# Patient Record
Sex: FEMALE | Race: WHITE | Employment: FULL TIME | URBAN - METROPOLITAN AREA
[De-identification: names, ages, dates, MRNs, and addresses within clinical notes are randomized per-mention and may not be internally consistent; named-entity substitution may affect disease eponyms.]

---

## 2020-01-27 ENCOUNTER — OFFICE VISIT (OUTPATIENT)
Dept: ORTHOPEDIC SURGERY | Age: 60
End: 2020-01-27
Payer: COMMERCIAL

## 2020-01-27 VITALS — WEIGHT: 285 LBS | HEIGHT: 63 IN | BODY MASS INDEX: 50.5 KG/M2

## 2020-01-27 PROCEDURE — 99203 OFFICE O/P NEW LOW 30 MIN: CPT | Performed by: ORTHOPAEDIC SURGERY

## 2020-01-27 PROCEDURE — 20610 DRAIN/INJ JOINT/BURSA W/O US: CPT | Performed by: ORTHOPAEDIC SURGERY

## 2020-01-27 RX ORDER — LIDOCAINE HYDROCHLORIDE 10 MG/ML
2 INJECTION, SOLUTION EPIDURAL; INFILTRATION; INTRACAUDAL; PERINEURAL ONCE
Status: COMPLETED | OUTPATIENT
Start: 2020-01-27 | End: 2020-01-27

## 2020-01-27 RX ORDER — BETAMETHASONE SODIUM PHOSPHATE AND BETAMETHASONE ACETATE 3; 3 MG/ML; MG/ML
12 INJECTION, SUSPENSION INTRA-ARTICULAR; INTRALESIONAL; INTRAMUSCULAR; SOFT TISSUE ONCE
Status: COMPLETED | OUTPATIENT
Start: 2020-01-27 | End: 2020-01-27

## 2020-01-27 RX ORDER — ATENOLOL 50 MG/1
50 TABLET ORAL DAILY
COMMUNITY

## 2020-01-27 RX ORDER — LOSARTAN POTASSIUM 50 MG/1
100 TABLET ORAL DAILY
COMMUNITY

## 2020-01-27 RX ORDER — MELOXICAM 15 MG/1
15 TABLET ORAL DAILY
Status: ON HOLD | COMMUNITY
End: 2020-10-20 | Stop reason: HOSPADM

## 2020-01-27 RX ORDER — HYDROCHLOROTHIAZIDE 12.5 MG/1
25 CAPSULE, GELATIN COATED ORAL DAILY
COMMUNITY

## 2020-01-27 RX ADMIN — LIDOCAINE HYDROCHLORIDE 2 ML: 10 INJECTION, SOLUTION EPIDURAL; INFILTRATION; INTRACAUDAL; PERINEURAL at 16:01

## 2020-01-27 RX ADMIN — BETAMETHASONE SODIUM PHOSPHATE AND BETAMETHASONE ACETATE 12 MG: 3; 3 INJECTION, SUSPENSION INTRA-ARTICULAR; INTRALESIONAL; INTRAMUSCULAR; SOFT TISSUE at 16:01

## 2020-05-20 ENCOUNTER — OFFICE VISIT (OUTPATIENT)
Dept: ORTHOPEDIC SURGERY | Age: 60
End: 2020-05-20
Payer: COMMERCIAL

## 2020-05-20 PROCEDURE — 20610 DRAIN/INJ JOINT/BURSA W/O US: CPT | Performed by: ORTHOPAEDIC SURGERY

## 2020-05-20 PROCEDURE — 99213 OFFICE O/P EST LOW 20 MIN: CPT | Performed by: ORTHOPAEDIC SURGERY

## 2020-05-20 RX ORDER — BUPIVACAINE HYDROCHLORIDE 5 MG/ML
2 INJECTION, SOLUTION PERINEURAL ONCE
Status: COMPLETED | OUTPATIENT
Start: 2020-05-20 | End: 2020-05-20

## 2020-05-20 RX ORDER — LIDOCAINE HYDROCHLORIDE 10 MG/ML
2 INJECTION, SOLUTION EPIDURAL; INFILTRATION; INTRACAUDAL; PERINEURAL ONCE
Status: COMPLETED | OUTPATIENT
Start: 2020-05-20 | End: 2020-05-20

## 2020-05-20 RX ORDER — BETAMETHASONE SODIUM PHOSPHATE AND BETAMETHASONE ACETATE 3; 3 MG/ML; MG/ML
12 INJECTION, SUSPENSION INTRA-ARTICULAR; INTRALESIONAL; INTRAMUSCULAR; SOFT TISSUE ONCE
Status: COMPLETED | OUTPATIENT
Start: 2020-05-20 | End: 2020-05-20

## 2020-05-20 RX ADMIN — BUPIVACAINE HYDROCHLORIDE 10 MG: 5 INJECTION, SOLUTION PERINEURAL at 15:28

## 2020-05-20 RX ADMIN — BETAMETHASONE SODIUM PHOSPHATE AND BETAMETHASONE ACETATE 12 MG: 3; 3 INJECTION, SUSPENSION INTRA-ARTICULAR; INTRALESIONAL; INTRAMUSCULAR; SOFT TISSUE at 15:27

## 2020-05-20 RX ADMIN — LIDOCAINE HYDROCHLORIDE 2 ML: 10 INJECTION, SOLUTION EPIDURAL; INFILTRATION; INTRACAUDAL; PERINEURAL at 15:28

## 2020-05-20 NOTE — PROGRESS NOTES
apnea      Past Surgical History:   Procedure Laterality Date    ECTOPIC PREGNANCY SURGERY       No family history on file. An informed verbal consent for the procedure was obtained and risks including, but not limited to: allergy to medications, injection, bleeding, stiffness of joint, recurrence of symptoms, loss of function, swelling, drainage, irrigation, need for surgery and pseudo-septic inflammation, were explained to the patient. Also, discussed was the potential for further injections, irrigation and debridement and surgery. Alternate means of treatment have also been discussed with the patient. Administrations This Visit     betamethasone acetate-betamethasone sodium phosphate (CELESTONE) injection 12 mg     Admin Date  05/20/2020  15:27 Action  Given Dose  12 mg Route  Intra-articular Site  Knee Left Administered By  Mary Kate Weaver    Ordering Provider:  Dameon Dumont MD    NDC:  1512-7019-58    Lot#:  [id number]    :  [id number] [address]iko Hackett     Patient Supplied?:  No          bupivacaine (MARCAINE) 0.5 % injection 10 mg     Admin Date  05/20/2020  15:28 Action  Given Dose  10 mg Route  Intra-articular Site  Knee Left Administered By  Mary Kate Weaver    Ordering Provider:  Dameon Dumont MD    ND:  6710-4604-24    Lot#:  [id number]    :  Svitlana Pouch    Patient Supplied?:  No          lidocaine PF 1 % injection 2 mL     Admin Date  05/20/2020  15:28 Action  Given Dose  2 mL Route  Intra-articular Site  Knee Left Administered By  Mary Kate Weaver    Ordering Provider:  Dameon Dumont MD    NDC:  3476-5548-02    Lot#:  [id number]    :  Brina Comer    Patient Supplied?:  No              Under sterile conditions the patient's right knee was injected with lidocaine and then followed with 2 cc of Celestone. She taught procedure well    Plan  Patient is aware that someday she will require total knee arthroplasty but she like to put this off. She was given a handicap parking prescription. When she does return here in 4 months we will require new standing x-rays of her knees. We will see her back at that time          Provider Attestation:  Shamika Olivares, personally performed the services described in this documentation. All medical record entries made by the scribe were at my direction and in my presence. I have reviewed the chart and discharge instructions and agree that the records reflect my personal performance and is accurate and complete. Adrianne Lee MD. 05/20/20      Please note that this chart was generated using voice recognition Dragon dictation software. Although every effort was made to ensure the accuracy of this automated transcription, some errors in transcription may have occurred.

## 2020-09-23 ENCOUNTER — OFFICE VISIT (OUTPATIENT)
Dept: ORTHOPEDIC SURGERY | Age: 60
End: 2020-09-23
Payer: COMMERCIAL

## 2020-09-23 PROCEDURE — 99213 OFFICE O/P EST LOW 20 MIN: CPT | Performed by: ORTHOPAEDIC SURGERY

## 2020-10-02 ENCOUNTER — TELEPHONE (OUTPATIENT)
Dept: ORTHOPEDIC SURGERY | Age: 60
End: 2020-10-02

## 2020-10-02 NOTE — TELEPHONE ENCOUNTER
[address] with Authorization for surgery [address], # [id number] good from 10/20/20 to 12/18/20.

## 2020-10-06 ENCOUNTER — HOSPITAL ENCOUNTER (OUTPATIENT)
Dept: PREADMISSION TESTING | Age: 60
Discharge: HOME | End: 2020-10-10
Payer: COMMERCIAL

## 2020-10-06 VITALS
WEIGHT: 280 LBS | RESPIRATION RATE: 16 BRPM | SYSTOLIC BLOOD PRESSURE: 159 MMHG | OXYGEN SATURATION: 97 % | HEART RATE: 70 BPM | TEMPERATURE: 97.6 F | DIASTOLIC BLOOD PRESSURE: 74 MMHG | HEIGHT: 63 IN | BODY MASS INDEX: 49.61 KG/M2

## 2020-10-06 LAB
-: ABNORMAL
ABSOLUTE EOS #: 0.1 K/UL (ref 0–0.4)
ABSOLUTE IMMATURE GRANULOCYTE: ABNORMAL K/UL (ref 0–0.3)
ABSOLUTE LYMPH #: 2 K/UL (ref 1–4.8)
ABSOLUTE MONO #: 0.5 K/UL (ref 0.1–1.3)
AMORPHOUS: ABNORMAL
ANION GAP SERPL CALCULATED.3IONS-SCNC: 13 MMOL/L (ref 9–17)
BACTERIA: ABNORMAL
BASOPHILS # BLD: 1 % (ref 0–2)
BASOPHILS ABSOLUTE: 0 K/UL (ref 0–0.2)
BILIRUBIN URINE: NEGATIVE
BUN BLDV-MCNC: 18 MG/DL (ref 8–23)
BUN/CREAT BLD: NORMAL (ref 9–20)
CALCIUM SERPL-MCNC: 10.1 MG/DL (ref 8.6–10.4)
CASTS UA: ABNORMAL /LPF
CHLORIDE BLD-SCNC: 102 MMOL/L (ref 98–107)
CO2: 26 MMOL/L (ref 20–31)
COLOR: YELLOW
COMMENT UA: ABNORMAL
CREAT SERPL-MCNC: 0.89 MG/DL (ref 0.5–0.9)
CRYSTALS, UA: ABNORMAL /HPF
DIFFERENTIAL TYPE: ABNORMAL
EOSINOPHILS RELATIVE PERCENT: 1 % (ref 0–4)
EPITHELIAL CELLS UA: ABNORMAL /HPF
GFR AFRICAN AMERICAN: >60 ML/MIN
GFR NON-AFRICAN AMERICAN: >60 ML/MIN
GFR SERPL CREATININE-BSD FRML MDRD: NORMAL ML/MIN/{1.73_M2}
GFR SERPL CREATININE-BSD FRML MDRD: NORMAL ML/MIN/{1.73_M2}
GLUCOSE BLD-MCNC: 96 MG/DL (ref 70–99)
GLUCOSE URINE: NEGATIVE
HCT VFR BLD CALC: 44.7 % (ref 36–46)
HEMOGLOBIN: 15.1 G/DL (ref 12–16)
IMMATURE GRANULOCYTES: ABNORMAL %
KETONES, URINE: NEGATIVE
LEUKOCYTE ESTERASE, URINE: NEGATIVE
LYMPHOCYTES # BLD: 33 % (ref 24–44)
MCH RBC QN AUTO: 30.9 PG (ref 26–34)
MCHC RBC AUTO-ENTMCNC: 33.8 G/DL (ref 31–37)
MCV RBC AUTO: 91.4 FL (ref 80–100)
MONOCYTES # BLD: 8 % (ref 1–7)
MUCUS: ABNORMAL
NITRITE, URINE: NEGATIVE
NRBC AUTOMATED: ABNORMAL PER 100 WBC
OTHER OBSERVATIONS UA: ABNORMAL
PDW BLD-RTO: 14.1 % (ref 11.5–14.9)
PH UA: 5 (ref 5–8)
PLATELET # BLD: 207 K/UL (ref 150–450)
PLATELET ESTIMATE: ABNORMAL
PMV BLD AUTO: 10.6 FL (ref 6–12)
POTASSIUM SERPL-SCNC: 4.4 MMOL/L (ref 3.7–5.3)
PROTEIN UA: NEGATIVE
RBC # BLD: 4.89 M/UL (ref 4–5.2)
RBC # BLD: ABNORMAL 10*6/UL
RBC UA: ABNORMAL /HPF
RENAL EPITHELIAL, UA: ABNORMAL /HPF
SEG NEUTROPHILS: 57 % (ref 36–66)
SEGMENTED NEUTROPHILS ABSOLUTE COUNT: 3.4 K/UL (ref 1.3–9.1)
SODIUM BLD-SCNC: 141 MMOL/L (ref 135–144)
SPECIFIC GRAVITY UA: 1.01 (ref 1–1.03)
TRICHOMONAS: ABNORMAL
TURBIDITY: CLEAR
URINE HGB: ABNORMAL
UROBILINOGEN, URINE: NORMAL
WBC # BLD: 6 K/UL (ref 3.5–11)
WBC # BLD: ABNORMAL 10*3/UL
WBC UA: ABNORMAL /HPF
YEAST: ABNORMAL

## 2020-10-06 PROCEDURE — 36415 COLL VENOUS BLD VENIPUNCTURE: CPT

## 2020-10-06 PROCEDURE — 80048 BASIC METABOLIC PNL TOTAL CA: CPT

## 2020-10-06 PROCEDURE — 87641 MR-STAPH DNA AMP PROBE: CPT

## 2020-10-06 PROCEDURE — 81001 URINALYSIS AUTO W/SCOPE: CPT

## 2020-10-06 PROCEDURE — 93005 ELECTROCARDIOGRAM TRACING: CPT | Performed by: ANESTHESIOLOGY

## 2020-10-06 PROCEDURE — 85025 COMPLETE CBC W/AUTO DIFF WBC: CPT

## 2020-10-06 NOTE — H&P (VIEW-ONLY)
HISTORY and Robina Crowder 5747       NAME:  Lc Moreno  MRN: [id number]   YOB: 1960   Date: 10/7/2020   Age: 56 y.o. Gender: female       COMPLAINT AND PRESENT HISTORY:     Urbano Lam is 56 y.o.,  female, undergoing preadmission testing for left knee pain with scheduled left total knee arthroplasty. Pt has history of degenerative joint disease to left knee. Pt c/o pain,  swelling, stiffness, popping and cracking in the left knee. Describes the pain as constant, worse with any activity. Pain is aggravated with walking for prolonged periods. Rating pain 8/10 in intensity at times. Takes Mobic with good relief. Resting helps alleviated pain. Pain does not radiate. Denies numbness or tingling. The knee does not buckle, give way under the patient. No recent falls, injury or trauma. No redness or rashes. No Hx of MRSA infections in the past.     PMHx  HTN: Takes losartan, HCTA and atenolol. Sleep Apnea: wears CPAP at night. Denies need for supplemental O2. PAST MEDICAL HISTORY     Past Medical History:   Diagnosis Date    Arthritis     HTN (hypertension)     Sleep apnea     cpap     Pt denies any history of Diabetes mellitus type 2, stroke, heart disease, COPD, Asthma, GERD, HLD, Cancer, Seizures,Thyroid disease, Kidney Disease, Hepatitis, TB, Psychiatric Disorders or Substance abuse. SURGICAL HISTORY       Past Surgical History:   Procedure Laterality Date    ECTOPIC PREGNANCY SURGERY         FAMILY HISTORY     History reviewed. No pertinent family history.     SOCIAL HISTORY       Social History     Socioeconomic History    Marital status:      Spouse name: None    Number of children: None    Years of education: None    Highest education level: None   Occupational History    None   Social Needs    Financial resource strain: None    Food insecurity     Worry: None     Inability: None    Transportation needs     Medical: None Non-medical: None   Tobacco Use    Smoking status: Never Smoker    Smokeless tobacco: Never Used   Substance and Sexual Activity    Alcohol use: Yes     Comment: social but rarely    Drug use: Never    Sexual activity: None   Lifestyle    Physical activity     Days per week: None     Minutes per session: None    Stress: None   Relationships    Social connections     Talks on phone: None     Gets together: None     Attends Restorationism service: None     Active member of club or organization: None     Attends meetings of clubs or organizations: None     Relationship status: None    Intimate partner violence     Fear of current or ex partner: None     Emotionally abused: None     Physically abused: None     Forced sexual activity: None   Other Topics Concern    None   Social History Narrative    None        REVIEW OF SYSTEMS      Allergies   Allergen Reactions    Lisinopril Other (See Comments)     Cough        Current Outpatient Medications on File Prior to Encounter   Medication Sig Dispense Refill    losartan (COZAAR) 50 MG tablet Take 50 mg by mouth daily      hydrochlorothiazide (MICROZIDE) 12.5 MG capsule Take 12.5 mg by mouth daily      meloxicam (MOBIC) 15 MG tablet Take 15 mg by mouth daily      atenolol (TENORMIN) 50 MG tablet Take 50 mg by mouth daily       No current facility-administered medications on file prior to encounter. General health:  Fairly good. No fever or chills. Skin:  No itching, redness or rash. HEENT:  No headache, epistaxis or sore throat. Neck:  No pain, stiffness or masses. Cardiovascular/Respiratory system:  No chest pain, palpitation or shortness of breath. Gastrointestinal tract: No abdominal pain, Dysphagia, nausea, vomiting, diarrhea or constipation. Genitourinary:  No burning on micturition. No hesitancy, urgency, frequency or discoloration of urine. Locomotor:  See HPI. Neuropsychiatric:  No referable complaints. GENERAL PHYSICAL EXAM:     Vitals: BP (!) 159/74   Pulse 70   Temp 97.6 °F (36.4 °C) (Temporal)   Resp 16   Ht 5' 3\" (1.6 m)   Wt 280 lb (127 kg)   SpO2 97%   BMI 49.60 kg/m²  Body mass index is 49.6 kg/m². GENERAL APPEARANCE:   Ariella Lam is 56 y.o.,  female, moderately obese, nourished, conscious, alert. Does not appear to be in distress or pain at this time. SKIN:  Warm, dry, no cyanosis or jaundice. HEAD:  Normocephalic, atraumatic. EYES:  Pupils equal, reactive to light. EARS:  No discharge, no marked hearing loss. NOSE:  No rhinorrhea, epistaxis or septal deformity. THROAT:  Not congested. No ulceration bleeding or discharge. NECK:  No stiffness, trachea central.  No palpable masses or L.N.                 CHEST:  Symmetrical and equal on expansion. HEART:  Hypertensive. RRR S1 > S2. No audible murmurs or gallops. LUNGS:  Equal on expansion, normal breath sounds. No wheezing, rhonchi or rales. ABDOMEN:  Soft on palpation. No localized tenderness. No guarding or rigidity. LYMPHATICS:  No palpable cervical lymphadenopathy. LOCOMOTOR, BACK AND SPINE:  No tenderness or deformities. EXTREMITIES:  Tenderness on palpation of the left knee joint space worse on the medial and lateral aspect. Symmetrical, no pretibial edema. No calf tenderness. No discoloration or ulcerations. NEUROLOGIC:  The patient is conscious, alert, oriented. No facial drooping. Speech clear. No apparent focal sensory or motor deficits.                                                                                      PROVISIONAL DIAGNOSES / SURGERY:      Left knee pain    KNEE TOTAL ARTHROPLASTY  Left      There are no active

## 2020-10-06 NOTE — H&P
HISTORY and Robina Crowder 5747       NAME:  Katherine Higgins  MRN: [id number]   YOB: 1960   Date: 10/7/2020   Age: 56 y.o. Gender: female       COMPLAINT AND PRESENT HISTORY:     Andre Lam is 56 y.o.,  female, undergoing preadmission testing for left knee pain with scheduled left total knee arthroplasty. Pt has history of degenerative joint disease to left knee. Pt c/o pain,  swelling, stiffness, popping and cracking in the left knee. Describes the pain as constant, worse with any activity. Pain is aggravated with walking for prolonged periods. Rating pain 8/10 in intensity at times. Takes Mobic with good relief. Resting helps alleviated pain. Pain does not radiate. Denies numbness or tingling. The knee does not buckle, give way under the patient. No recent falls, injury or trauma. No redness or rashes. No Hx of MRSA infections in the past.     PMHx  HTN: Takes losartan, HCTA and atenolol. Sleep Apnea: wears CPAP at night. Denies need for supplemental O2. PAST MEDICAL HISTORY     Past Medical History:   Diagnosis Date    Arthritis     HTN (hypertension)     Sleep apnea     cpap     Pt denies any history of Diabetes mellitus type 2, stroke, heart disease, COPD, Asthma, GERD, HLD, Cancer, Seizures,Thyroid disease, Kidney Disease, Hepatitis, TB, Psychiatric Disorders or Substance abuse. SURGICAL HISTORY       Past Surgical History:   Procedure Laterality Date    ECTOPIC PREGNANCY SURGERY         FAMILY HISTORY     History reviewed. No pertinent family history.     SOCIAL HISTORY       Social History     Socioeconomic History    Marital status:      Spouse name: None    Number of children: None    Years of education: None    Highest education level: None   Occupational History    None   Social Needs    Financial resource strain: None    Food insecurity     Worry: None     Inability: None    Transportation needs     Medical: None Non-medical: None   Tobacco Use    Smoking status: Never Smoker    Smokeless tobacco: Never Used   Substance and Sexual Activity    Alcohol use: Yes     Comment: social but rarely    Drug use: Never    Sexual activity: None   Lifestyle    Physical activity     Days per week: None     Minutes per session: None    Stress: None   Relationships    Social connections     Talks on phone: None     Gets together: None     Attends Muslim service: None     Active member of club or organization: None     Attends meetings of clubs or organizations: None     Relationship status: None    Intimate partner violence     Fear of current or ex partner: None     Emotionally abused: None     Physically abused: None     Forced sexual activity: None   Other Topics Concern    None   Social History Narrative    None        REVIEW OF SYSTEMS      Allergies   Allergen Reactions    Lisinopril Other (See Comments)     Cough        Current Outpatient Medications on File Prior to Encounter   Medication Sig Dispense Refill    losartan (COZAAR) 50 MG tablet Take 50 mg by mouth daily      hydrochlorothiazide (MICROZIDE) 12.5 MG capsule Take 12.5 mg by mouth daily      meloxicam (MOBIC) 15 MG tablet Take 15 mg by mouth daily      atenolol (TENORMIN) 50 MG tablet Take 50 mg by mouth daily       No current facility-administered medications on file prior to encounter. General health:  Fairly good. No fever or chills. Skin:  No itching, redness or rash. HEENT:  No headache, epistaxis or sore throat. Neck:  No pain, stiffness or masses. Cardiovascular/Respiratory system:  No chest pain, palpitation or shortness of breath. Gastrointestinal tract: No abdominal pain, Dysphagia, nausea, vomiting, diarrhea or constipation. Genitourinary:  No burning on micturition. No hesitancy, urgency, frequency or discoloration of urine. Locomotor:  See HPI. Neuropsychiatric:  No referable complaints. GENERAL PHYSICAL EXAM:     Vitals: BP (!) 159/74   Pulse 70   Temp 97.6 °F (36.4 °C) (Temporal)   Resp 16   Ht 5' 3\" (1.6 m)   Wt 280 lb (127 kg)   SpO2 97%   BMI 49.60 kg/m²  Body mass index is 49.6 kg/m². GENERAL APPEARANCE:   Lyn Lam is 56 y.o.,  female, moderately obese, nourished, conscious, alert. Does not appear to be in distress or pain at this time. SKIN:  Warm, dry, no cyanosis or jaundice. HEAD:  Normocephalic, atraumatic. EYES:  Pupils equal, reactive to light. EARS:  No discharge, no marked hearing loss. NOSE:  No rhinorrhea, epistaxis or septal deformity. THROAT:  Not congested. No ulceration bleeding or discharge. NECK:  No stiffness, trachea central.  No palpable masses or L.N.                 CHEST:  Symmetrical and equal on expansion. HEART:  Hypertensive. RRR S1 > S2. No audible murmurs or gallops. LUNGS:  Equal on expansion, normal breath sounds. No wheezing, rhonchi or rales. ABDOMEN:  Soft on palpation. No localized tenderness. No guarding or rigidity. LYMPHATICS:  No palpable cervical lymphadenopathy. LOCOMOTOR, BACK AND SPINE:  No tenderness or deformities. EXTREMITIES:  Tenderness on palpation of the left knee joint space worse on the medial and lateral aspect. Symmetrical, no pretibial edema. No calf tenderness. No discoloration or ulcerations. NEUROLOGIC:  The patient is conscious, alert, oriented. No facial drooping. Speech clear. No apparent focal sensory or motor deficits.                                                                                      PROVISIONAL DIAGNOSES / SURGERY:      Left knee pain    KNEE TOTAL ARTHROPLASTY  Left      There are no active problems to display for this patient.           JOJO Anthony - CNP on 10/7/2020 at 8:01 AM

## 2020-10-06 NOTE — PROGRESS NOTES
Dr. Aggarwal Records, anesthesia, was contacted and informed of the patient's planned surgery, history, and unconfirmed abnormal EKG results from EKG done today in [facility]. No clearance required.

## 2020-10-07 LAB
EKG ATRIAL RATE: 66 BPM
EKG P AXIS: 60 DEGREES
EKG P-R INTERVAL: 176 MS
EKG Q-T INTERVAL: 408 MS
EKG QRS DURATION: 102 MS
EKG QTC CALCULATION (BAZETT): 427 MS
EKG R AXIS: -16 DEGREES
EKG T AXIS: 19 DEGREES
EKG VENTRICULAR RATE: 66 BPM
MRSA, DNA, NASAL: ABNORMAL
SPECIMEN DESCRIPTION: ABNORMAL

## 2020-10-07 PROCEDURE — 93010 ELECTROCARDIOGRAM REPORT: CPT | Performed by: INTERNAL MEDICINE

## 2020-10-08 ASSESSMENT — PROMIS GLOBAL HEALTH SCALE
IN GENERAL, PLEASE RATE HOW WELL YOU CARRY OUT YOUR USUAL SOCIAL ACTIVITIES (INCLUDES ACTIVITIES AT HOME, AT WORK, AND IN YOUR COMMUNITY, AND RESPONSIBILITIES AS A PARENT, CHILD, SPOUSE, EMPLOYEE, FRIEND, ETC) [ON A SCALE OF 1 (POOR) TO 5 (EXCELLENT)]?: 4
IN THE PAST 7 DAYS, HOW WOULD YOU RATE YOUR FATIGUE ON AVERAGE [ON A SCALE FROM 1 (NONE) TO 5 (VERY SEVERE)]?: 4
HOW IS THE PROMIS V1.1 BEING ADMINISTERED?: 2
IN GENERAL, WOULD YOU SAY YOUR QUALITY OF LIFE IS...[ON A SCALE OF 1 (POOR) TO 5 (EXCELLENT)]: 4
IN GENERAL, HOW WOULD YOU RATE YOUR PHYSICAL HEALTH [ON A SCALE OF 1 (POOR) TO 5 (EXCELLENT)]?: 3
IN GENERAL, HOW WOULD YOU RATE YOUR MENTAL HEALTH, INCLUDING YOUR MOOD AND YOUR ABILITY TO THINK [ON A SCALE OF 1 (POOR) TO 5 (EXCELLENT)]?: 4
IN THE PAST 7 DAYS, HOW OFTEN HAVE YOU BEEN BOTHERED BY EMOTIONAL PROBLEMS, SUCH AS FEELING ANXIOUS, DEPRESSED, OR IRRITABLE [ON A SCALE FROM 1 (NEVER) TO 5 (ALWAYS)]?: 2
IN GENERAL, HOW WOULD YOU RATE YOUR SATISFACTION WITH YOUR SOCIAL ACTIVITIES AND RELATIONSHIPS [ON A SCALE OF 1 (POOR) TO 5 (EXCELLENT)]?: 4
WHO IS THE PERSON COMPLETING THE PROMIS V1.1 SURVEY?: 0
TO WHAT EXTENT ARE YOU ABLE TO CARRY OUT YOUR EVERYDAY PHYSICAL ACTIVITIES SUCH AS WALKING, CLIMBING STAIRS, CARRYING GROCERIES, OR MOVING A CHAIR [ON A SCALE OF 1 (NOT AT ALL) TO 5 (COMPLETELY)]?: 2
IN GENERAL, WOULD YOU SAY YOUR HEALTH IS...[ON A SCALE OF 1 (POOR) TO 5 (EXCELLENT)]: 3
IN THE PAST 7 DAYS, HOW WOULD YOU RATE YOUR PAIN ON AVERAGE [ON A SCALE FROM 0 (NO PAIN) TO 10 (WORST IMAGINABLE PAIN)]?: 6

## 2020-10-08 ASSESSMENT — KOOS JR
STANDING UPRIGHT: 2
STRAIGHTENING KNEE FULLY: 2
BENDING TO THE FLOOR TO PICK UP OBJECT: 2
GOING UP OR DOWN STAIRS: 3
TWISING OR PIVOTING ON KNEE: 2
RISING FROM SITTING: 2
HOW SEVERE IS YOUR KNEE STIFFNESS AFTER FIRST WAKING IN MORNING: 3
HOW SEVERE IS YOUR KNEE STIFFNESS AFTER FIRST WAKING IN MORNING: 2

## 2020-10-16 ENCOUNTER — HOSPITAL ENCOUNTER (OUTPATIENT)
Dept: PREADMISSION TESTING | Age: 60
Setting detail: SPECIMEN
Discharge: HOME | End: 2020-10-20
Payer: COMMERCIAL

## 2020-10-16 PROCEDURE — U0003 INFECTIOUS AGENT DETECTION BY NUCLEIC ACID (DNA OR RNA); SEVERE ACUTE RESPIRATORY SYNDROME CORONAVIRUS 2 (SARS-COV-2) (CORONAVIRUS DISEASE [COVID-19]), AMPLIFIED PROBE TECHNIQUE, MAKING USE OF HIGH THROUGHPUT TECHNOLOGIES AS DESCRIBED BY CMS-2020-01-R: HCPCS

## 2020-10-18 LAB — SARS-COV-2, NAA: NOT DETECTED

## 2020-10-19 ENCOUNTER — ANESTHESIA EVENT (OUTPATIENT)
Dept: OPERATING ROOM | Age: 60
End: 2020-10-19
Payer: COMMERCIAL

## 2020-10-19 ENCOUNTER — TELEPHONE (OUTPATIENT)
Dept: PRIMARY CARE CLINIC | Age: 60
End: 2020-10-19

## 2020-10-20 ENCOUNTER — APPOINTMENT (OUTPATIENT)
Dept: GENERAL RADIOLOGY | Age: 60
End: 2020-10-20
Attending: ORTHOPAEDIC SURGERY
Payer: COMMERCIAL

## 2020-10-20 ENCOUNTER — ANESTHESIA (OUTPATIENT)
Dept: OPERATING ROOM | Age: 60
End: 2020-10-20
Payer: COMMERCIAL

## 2020-10-20 ENCOUNTER — HOSPITAL ENCOUNTER (OUTPATIENT)
Age: 60
Discharge: HOME | End: 2020-10-20
Attending: ORTHOPAEDIC SURGERY | Admitting: ORTHOPAEDIC SURGERY
Payer: COMMERCIAL

## 2020-10-20 VITALS — TEMPERATURE: 97 F | SYSTOLIC BLOOD PRESSURE: 138 MMHG | DIASTOLIC BLOOD PRESSURE: 79 MMHG | OXYGEN SATURATION: 99 %

## 2020-10-20 VITALS
RESPIRATION RATE: 16 BRPM | TEMPERATURE: 97.7 F | WEIGHT: 280 LBS | OXYGEN SATURATION: 100 % | HEART RATE: 51 BPM | BODY MASS INDEX: 49.61 KG/M2 | HEIGHT: 63 IN | DIASTOLIC BLOOD PRESSURE: 55 MMHG | SYSTOLIC BLOOD PRESSURE: 107 MMHG

## 2020-10-20 PROBLEM — M17.12 PRIMARY OSTEOARTHRITIS OF LEFT KNEE: Status: ACTIVE | Noted: 2020-10-20

## 2020-10-20 PROCEDURE — C1776 JOINT DEVICE (IMPLANTABLE): HCPCS | Performed by: ORTHOPAEDIC SURGERY

## 2020-10-20 PROCEDURE — 2709999900 HC NON-CHARGEABLE SUPPLY: Performed by: ORTHOPAEDIC SURGERY

## 2020-10-20 PROCEDURE — 97535 SELF CARE MNGMENT TRAINING: CPT

## 2020-10-20 PROCEDURE — 97110 THERAPEUTIC EXERCISES: CPT

## 2020-10-20 PROCEDURE — 97116 GAIT TRAINING THERAPY: CPT

## 2020-10-20 PROCEDURE — 6360000002 HC RX W HCPCS: Performed by: ORTHOPAEDIC SURGERY

## 2020-10-20 PROCEDURE — 3700000000 HC ANESTHESIA ATTENDED CARE: Performed by: ORTHOPAEDIC SURGERY

## 2020-10-20 PROCEDURE — 7100000000 HC PACU RECOVERY - FIRST 15 MIN: Performed by: ORTHOPAEDIC SURGERY

## 2020-10-20 PROCEDURE — 97165 OT EVAL LOW COMPLEX 30 MIN: CPT

## 2020-10-20 PROCEDURE — 97162 PT EVAL MOD COMPLEX 30 MIN: CPT

## 2020-10-20 PROCEDURE — 2500000003 HC RX 250 WO HCPCS: Performed by: ORTHOPAEDIC SURGERY

## 2020-10-20 PROCEDURE — C9290 INJ, BUPIVACAINE LIPOSOME: HCPCS | Performed by: ANESTHESIOLOGY

## 2020-10-20 PROCEDURE — 2580000003 HC RX 258: Performed by: ORTHOPAEDIC SURGERY

## 2020-10-20 PROCEDURE — 2720000010 HC SURG SUPPLY STERILE: Performed by: ORTHOPAEDIC SURGERY

## 2020-10-20 PROCEDURE — 3700000001 HC ADD 15 MINUTES (ANESTHESIA): Performed by: ORTHOPAEDIC SURGERY

## 2020-10-20 PROCEDURE — 73560 X-RAY EXAM OF KNEE 1 OR 2: CPT

## 2020-10-20 PROCEDURE — 2580000003 HC RX 258: Performed by: ANESTHESIOLOGY

## 2020-10-20 PROCEDURE — 27447 TOTAL KNEE ARTHROPLASTY: CPT | Performed by: ORTHOPAEDIC SURGERY

## 2020-10-20 PROCEDURE — 6370000000 HC RX 637 (ALT 250 FOR IP): Performed by: ORTHOPAEDIC SURGERY

## 2020-10-20 PROCEDURE — 3600000013 HC SURGERY LEVEL 3 ADDTL 15MIN: Performed by: ORTHOPAEDIC SURGERY

## 2020-10-20 PROCEDURE — 6360000002 HC RX W HCPCS: Performed by: NURSE ANESTHETIST, CERTIFIED REGISTERED

## 2020-10-20 PROCEDURE — 3600000003 HC SURGERY LEVEL 3 BASE: Performed by: ORTHOPAEDIC SURGERY

## 2020-10-20 PROCEDURE — 2500000003 HC RX 250 WO HCPCS: Performed by: NURSE ANESTHETIST, CERTIFIED REGISTERED

## 2020-10-20 PROCEDURE — C1713 ANCHOR/SCREW BN/BN,TIS/BN: HCPCS | Performed by: ORTHOPAEDIC SURGERY

## 2020-10-20 PROCEDURE — 2500000003 HC RX 250 WO HCPCS: Performed by: ANESTHESIOLOGY

## 2020-10-20 PROCEDURE — 7100000001 HC PACU RECOVERY - ADDTL 15 MIN: Performed by: ORTHOPAEDIC SURGERY

## 2020-10-20 PROCEDURE — 6360000002 HC RX W HCPCS: Performed by: ANESTHESIOLOGY

## 2020-10-20 PROCEDURE — 64447 NJX AA&/STRD FEMORAL NRV IMG: CPT | Performed by: ANESTHESIOLOGY

## 2020-10-20 DEVICE — CEMENT BNE 40GM HI VISC RADPQ FOR REV SURG: Type: IMPLANTABLE DEVICE | Site: KNEE | Status: FUNCTIONAL

## 2020-10-20 DEVICE — TRAY TIB L71MM KNEE CO CHROM FIN MOD INTLOK VANGUARD: Type: IMPLANTABLE DEVICE | Site: KNEE | Status: FUNCTIONAL

## 2020-10-20 DEVICE — COMPONENT PAT DIA31MM THK8MM STD KNEE TI ALLY S STL UHMWPE: Type: IMPLANTABLE DEVICE | Site: KNEE | Status: FUNCTIONAL

## 2020-10-20 DEVICE — IMPLANTABLE DEVICE: Type: IMPLANTABLE DEVICE | Site: KNEE | Status: FUNCTIONAL

## 2020-10-20 DEVICE — VNGD ANT STAB BRG 13X71: Type: IMPLANTABLE DEVICE | Site: KNEE | Status: FUNCTIONAL

## 2020-10-20 RX ORDER — LABETALOL HYDROCHLORIDE 5 MG/ML
5 INJECTION, SOLUTION INTRAVENOUS EVERY 10 MIN PRN
Status: DISCONTINUED | OUTPATIENT
Start: 2020-10-20 | End: 2020-10-20 | Stop reason: HOSPADM

## 2020-10-20 RX ORDER — OXYCODONE HYDROCHLORIDE AND ACETAMINOPHEN 5; 325 MG/1; MG/1
1 TABLET ORAL PRN
Status: DISCONTINUED | OUTPATIENT
Start: 2020-10-20 | End: 2020-10-20 | Stop reason: HOSPADM

## 2020-10-20 RX ORDER — NEOSTIGMINE METHYLSULFATE 5 MG/5 ML
SYRINGE (ML) INTRAVENOUS PRN
Status: DISCONTINUED | OUTPATIENT
Start: 2020-10-20 | End: 2020-10-20 | Stop reason: SDUPTHER

## 2020-10-20 RX ORDER — OXYCODONE HYDROCHLORIDE AND ACETAMINOPHEN 5; 325 MG/1; MG/1
2 TABLET ORAL PRN
Status: DISCONTINUED | OUTPATIENT
Start: 2020-10-20 | End: 2020-10-20 | Stop reason: HOSPADM

## 2020-10-20 RX ORDER — ROCURONIUM BROMIDE 10 MG/ML
INJECTION, SOLUTION INTRAVENOUS PRN
Status: DISCONTINUED | OUTPATIENT
Start: 2020-10-20 | End: 2020-10-20 | Stop reason: SDUPTHER

## 2020-10-20 RX ORDER — SENNA AND DOCUSATE SODIUM 50; 8.6 MG/1; MG/1
1 TABLET, FILM COATED ORAL 2 TIMES DAILY
Status: DISCONTINUED | OUTPATIENT
Start: 2020-10-20 | End: 2020-10-20 | Stop reason: HOSPADM

## 2020-10-20 RX ORDER — SODIUM CHLORIDE, SODIUM LACTATE, POTASSIUM CHLORIDE, CALCIUM CHLORIDE 600; 310; 30; 20 MG/100ML; MG/100ML; MG/100ML; MG/100ML
INJECTION, SOLUTION INTRAVENOUS CONTINUOUS
Status: DISCONTINUED | OUTPATIENT
Start: 2020-10-20 | End: 2020-10-20

## 2020-10-20 RX ORDER — FENTANYL CITRATE 50 UG/ML
INJECTION, SOLUTION INTRAMUSCULAR; INTRAVENOUS PRN
Status: DISCONTINUED | OUTPATIENT
Start: 2020-10-20 | End: 2020-10-20 | Stop reason: SDUPTHER

## 2020-10-20 RX ORDER — SODIUM CHLORIDE 0.9 % (FLUSH) 0.9 %
10 SYRINGE (ML) INJECTION EVERY 12 HOURS SCHEDULED
Status: DISCONTINUED | OUTPATIENT
Start: 2020-10-20 | End: 2020-10-20 | Stop reason: HOSPADM

## 2020-10-20 RX ORDER — SODIUM CHLORIDE 0.9 % (FLUSH) 0.9 %
10 SYRINGE (ML) INJECTION PRN
Status: DISCONTINUED | OUTPATIENT
Start: 2020-10-20 | End: 2020-10-20 | Stop reason: HOSPADM

## 2020-10-20 RX ORDER — ACETAMINOPHEN 325 MG/1
650 TABLET ORAL EVERY 6 HOURS
Status: DISCONTINUED | OUTPATIENT
Start: 2020-10-20 | End: 2020-10-20 | Stop reason: HOSPADM

## 2020-10-20 RX ORDER — LIDOCAINE HYDROCHLORIDE 10 MG/ML
1 INJECTION, SOLUTION EPIDURAL; INFILTRATION; INTRACAUDAL; PERINEURAL
Status: DISCONTINUED | OUTPATIENT
Start: 2020-10-20 | End: 2020-10-20 | Stop reason: HOSPADM

## 2020-10-20 RX ORDER — ONDANSETRON 2 MG/ML
4 INJECTION INTRAMUSCULAR; INTRAVENOUS EVERY 6 HOURS PRN
Status: DISCONTINUED | OUTPATIENT
Start: 2020-10-20 | End: 2020-10-20 | Stop reason: HOSPADM

## 2020-10-20 RX ORDER — OXYCODONE HYDROCHLORIDE AND ACETAMINOPHEN 5; 325 MG/1; MG/1
1-2 TABLET ORAL EVERY 4 HOURS PRN
Qty: 42 TABLET | Refills: 0 | Status: SHIPPED | OUTPATIENT
Start: 2020-10-20 | End: 2020-10-27

## 2020-10-20 RX ORDER — ASPIRIN/CALCIUM/MAG/ALUMINUM 325 MG
1 TABLET ORAL 2 TIMES DAILY
Qty: 30 TABLET | Refills: 3 | Status: SHIPPED | OUTPATIENT
Start: 2020-10-20 | End: 2022-10-12

## 2020-10-20 RX ORDER — CALCIUM CHLORIDE 100 MG/ML
INJECTION INTRAVENOUS; INTRAVENTRICULAR PRN
Status: DISCONTINUED | OUTPATIENT
Start: 2020-10-20 | End: 2020-10-20 | Stop reason: ALTCHOICE

## 2020-10-20 RX ORDER — DEXAMETHASONE SODIUM PHOSPHATE 10 MG/ML
10 INJECTION INTRAMUSCULAR; INTRAVENOUS ONCE
Status: COMPLETED | OUTPATIENT
Start: 2020-10-20 | End: 2020-10-20

## 2020-10-20 RX ORDER — BUPIVACAINE HYDROCHLORIDE 5 MG/ML
INJECTION, SOLUTION EPIDURAL; INTRACAUDAL
Status: DISCONTINUED | OUTPATIENT
Start: 2020-10-20 | End: 2020-10-20 | Stop reason: SDUPTHER

## 2020-10-20 RX ORDER — DIPHENHYDRAMINE HYDROCHLORIDE 50 MG/ML
12.5 INJECTION INTRAMUSCULAR; INTRAVENOUS
Status: DISCONTINUED | OUTPATIENT
Start: 2020-10-20 | End: 2020-10-20 | Stop reason: HOSPADM

## 2020-10-20 RX ORDER — ONDANSETRON 2 MG/ML
4 INJECTION INTRAMUSCULAR; INTRAVENOUS
Status: DISCONTINUED | OUTPATIENT
Start: 2020-10-20 | End: 2020-10-20 | Stop reason: HOSPADM

## 2020-10-20 RX ORDER — ONDANSETRON 2 MG/ML
INJECTION INTRAMUSCULAR; INTRAVENOUS PRN
Status: DISCONTINUED | OUTPATIENT
Start: 2020-10-20 | End: 2020-10-20 | Stop reason: SDUPTHER

## 2020-10-20 RX ORDER — LIDOCAINE HYDROCHLORIDE 10 MG/ML
INJECTION, SOLUTION EPIDURAL; INFILTRATION; INTRACAUDAL; PERINEURAL PRN
Status: DISCONTINUED | OUTPATIENT
Start: 2020-10-20 | End: 2020-10-20 | Stop reason: SDUPTHER

## 2020-10-20 RX ORDER — EPHEDRINE SULFATE/0.9% NACL/PF 50 MG/5 ML
SYRINGE (ML) INTRAVENOUS PRN
Status: DISCONTINUED | OUTPATIENT
Start: 2020-10-20 | End: 2020-10-20 | Stop reason: SDUPTHER

## 2020-10-20 RX ORDER — MIDAZOLAM HYDROCHLORIDE 1 MG/ML
INJECTION INTRAMUSCULAR; INTRAVENOUS PRN
Status: DISCONTINUED | OUTPATIENT
Start: 2020-10-20 | End: 2020-10-20 | Stop reason: SDUPTHER

## 2020-10-20 RX ORDER — SUCCINYLCHOLINE/SOD CL,ISO/PF 200MG/10ML
SYRINGE (ML) INTRAVENOUS PRN
Status: DISCONTINUED | OUTPATIENT
Start: 2020-10-20 | End: 2020-10-20 | Stop reason: SDUPTHER

## 2020-10-20 RX ORDER — DEXAMETHASONE SODIUM PHOSPHATE 4 MG/ML
INJECTION, SOLUTION INTRA-ARTICULAR; INTRALESIONAL; INTRAMUSCULAR; INTRAVENOUS; SOFT TISSUE PRN
Status: DISCONTINUED | OUTPATIENT
Start: 2020-10-20 | End: 2020-10-20 | Stop reason: SDUPTHER

## 2020-10-20 RX ORDER — OXYCODONE HYDROCHLORIDE 5 MG/1
5 TABLET ORAL EVERY 4 HOURS PRN
Status: DISCONTINUED | OUTPATIENT
Start: 2020-10-20 | End: 2020-10-20 | Stop reason: HOSPADM

## 2020-10-20 RX ORDER — PROMETHAZINE HYDROCHLORIDE 25 MG/1
12.5 TABLET ORAL EVERY 6 HOURS PRN
Status: DISCONTINUED | OUTPATIENT
Start: 2020-10-20 | End: 2020-10-20 | Stop reason: HOSPADM

## 2020-10-20 RX ORDER — GLYCOPYRROLATE 1 MG/5 ML
SYRINGE (ML) INTRAVENOUS PRN
Status: DISCONTINUED | OUTPATIENT
Start: 2020-10-20 | End: 2020-10-20 | Stop reason: SDUPTHER

## 2020-10-20 RX ORDER — SCOLOPAMINE TRANSDERMAL SYSTEM 1 MG/1
1 PATCH, EXTENDED RELEASE TRANSDERMAL ONCE
Status: DISCONTINUED | OUTPATIENT
Start: 2020-10-20 | End: 2020-10-20

## 2020-10-20 RX ORDER — GABAPENTIN 600 MG/1
600 TABLET ORAL ONCE
Status: COMPLETED | OUTPATIENT
Start: 2020-10-20 | End: 2020-10-20

## 2020-10-20 RX ORDER — ACETAMINOPHEN 500 MG
1000 TABLET ORAL ONCE
Status: COMPLETED | OUTPATIENT
Start: 2020-10-20 | End: 2020-10-20

## 2020-10-20 RX ORDER — OXYCODONE HYDROCHLORIDE 10 MG/1
10 TABLET ORAL EVERY 4 HOURS PRN
Status: DISCONTINUED | OUTPATIENT
Start: 2020-10-20 | End: 2020-10-20 | Stop reason: HOSPADM

## 2020-10-20 RX ORDER — SODIUM CHLORIDE, SODIUM LACTATE, POTASSIUM CHLORIDE, CALCIUM CHLORIDE 600; 310; 30; 20 MG/100ML; MG/100ML; MG/100ML; MG/100ML
INJECTION, SOLUTION INTRAVENOUS CONTINUOUS
Status: DISCONTINUED | OUTPATIENT
Start: 2020-10-20 | End: 2020-10-20 | Stop reason: HOSPADM

## 2020-10-20 RX ADMIN — GABAPENTIN 600 MG: 600 TABLET, FILM COATED ORAL at 07:59

## 2020-10-20 RX ADMIN — ROCURONIUM BROMIDE 30 MG: 10 INJECTION INTRAVENOUS at 08:54

## 2020-10-20 RX ADMIN — ACETAMINOPHEN 1000 MG: 500 TABLET, FILM COATED ORAL at 07:59

## 2020-10-20 RX ADMIN — SODIUM CHLORIDE, POTASSIUM CHLORIDE, SODIUM LACTATE AND CALCIUM CHLORIDE: 600; 310; 30; 20 INJECTION, SOLUTION INTRAVENOUS at 07:39

## 2020-10-20 RX ADMIN — BUPIVACAINE 10 ML: 13.3 INJECTION, SUSPENSION, LIPOSOMAL INFILTRATION at 11:08

## 2020-10-20 RX ADMIN — Medication 20 MG: at 09:06

## 2020-10-20 RX ADMIN — MIDAZOLAM 2 MG: 1 INJECTION INTRAMUSCULAR; INTRAVENOUS at 08:45

## 2020-10-20 RX ADMIN — DEXAMETHASONE SODIUM PHOSPHATE 10 MG: 10 INJECTION INTRAMUSCULAR; INTRAVENOUS at 08:00

## 2020-10-20 RX ADMIN — VANCOMYCIN HYDROCHLORIDE 2000 MG: 1 INJECTION, POWDER, LYOPHILIZED, FOR SOLUTION INTRAVENOUS at 07:53

## 2020-10-20 RX ADMIN — Medication 3 MG: at 10:15

## 2020-10-20 RX ADMIN — FENTANYL CITRATE 25 MCG: 50 INJECTION, SOLUTION INTRAMUSCULAR; INTRAVENOUS at 09:19

## 2020-10-20 RX ADMIN — FENTANYL CITRATE 50 MCG: 50 INJECTION, SOLUTION INTRAMUSCULAR; INTRAVENOUS at 08:51

## 2020-10-20 RX ADMIN — ONDANSETRON 4 MG: 2 INJECTION INTRAMUSCULAR; INTRAVENOUS at 09:01

## 2020-10-20 RX ADMIN — Medication 0.4 MG: at 10:15

## 2020-10-20 RX ADMIN — BUPIVACAINE HYDROCHLORIDE 10 ML: 5 INJECTION, SOLUTION EPIDURAL; INTRACAUDAL at 11:08

## 2020-10-20 RX ADMIN — FENTANYL CITRATE 25 MCG: 50 INJECTION, SOLUTION INTRAMUSCULAR; INTRAVENOUS at 09:22

## 2020-10-20 RX ADMIN — Medication 120 MG: at 08:51

## 2020-10-20 RX ADMIN — DEXAMETHASONE SODIUM PHOSPHATE 4 MG: 4 INJECTION, SOLUTION INTRA-ARTICULAR; INTRALESIONAL; INTRAMUSCULAR; INTRAVENOUS; SOFT TISSUE at 09:01

## 2020-10-20 RX ADMIN — LIDOCAINE HYDROCHLORIDE 50 MG: 10 INJECTION, SOLUTION EPIDURAL; INFILTRATION; INTRACAUDAL; PERINEURAL at 08:51

## 2020-10-20 RX ADMIN — VANCOMYCIN HYDROCHLORIDE 2 G: 1 INJECTION, POWDER, LYOPHILIZED, FOR SOLUTION INTRAVENOUS at 08:00

## 2020-10-20 RX ADMIN — Medication 0.2 MG: at 09:38

## 2020-10-20 RX ADMIN — ACETAMINOPHEN 650 MG: 325 TABLET, FILM COATED ORAL at 12:45

## 2020-10-20 RX ADMIN — SODIUM CHLORIDE, POTASSIUM CHLORIDE, SODIUM LACTATE AND CALCIUM CHLORIDE: 600; 310; 30; 20 INJECTION, SOLUTION INTRAVENOUS at 12:45

## 2020-10-20 ASSESSMENT — PULMONARY FUNCTION TESTS
PIF_VALUE: 2
PIF_VALUE: 10
PIF_VALUE: 28
PIF_VALUE: 26
PIF_VALUE: 11
PIF_VALUE: 11
PIF_VALUE: 28
PIF_VALUE: 29
PIF_VALUE: 30
PIF_VALUE: 29
PIF_VALUE: 11
PIF_VALUE: 29
PIF_VALUE: 25
PIF_VALUE: 30
PIF_VALUE: 30
PIF_VALUE: 25
PIF_VALUE: 25
PIF_VALUE: 28
PIF_VALUE: 29
PIF_VALUE: 11
PIF_VALUE: 30
PIF_VALUE: 11
PIF_VALUE: 30
PIF_VALUE: 29
PIF_VALUE: 12
PIF_VALUE: 0
PIF_VALUE: 25
PIF_VALUE: 25
PIF_VALUE: 29
PIF_VALUE: 20
PIF_VALUE: 27
PIF_VALUE: 29
PIF_VALUE: 11
PIF_VALUE: 27
PIF_VALUE: 10
PIF_VALUE: 26
PIF_VALUE: 25
PIF_VALUE: 29
PIF_VALUE: 10
PIF_VALUE: 0
PIF_VALUE: 32
PIF_VALUE: 29
PIF_VALUE: 29
PIF_VALUE: 23
PIF_VALUE: 29
PIF_VALUE: 10
PIF_VALUE: 11
PIF_VALUE: 24
PIF_VALUE: 29
PIF_VALUE: 27
PIF_VALUE: 11
PIF_VALUE: 29
PIF_VALUE: 20
PIF_VALUE: 29
PIF_VALUE: 11
PIF_VALUE: 11
PIF_VALUE: 0
PIF_VALUE: 29
PIF_VALUE: 25
PIF_VALUE: 30
PIF_VALUE: 20
PIF_VALUE: 30
PIF_VALUE: 21
PIF_VALUE: 30
PIF_VALUE: 23
PIF_VALUE: 29
PIF_VALUE: 21
PIF_VALUE: 29
PIF_VALUE: 11
PIF_VALUE: 24
PIF_VALUE: 31
PIF_VALUE: 11
PIF_VALUE: 25
PIF_VALUE: 30
PIF_VALUE: 24
PIF_VALUE: 21
PIF_VALUE: 25
PIF_VALUE: 11
PIF_VALUE: 10
PIF_VALUE: 0
PIF_VALUE: 1
PIF_VALUE: 29
PIF_VALUE: 11
PIF_VALUE: 24
PIF_VALUE: 19
PIF_VALUE: 28
PIF_VALUE: 11
PIF_VALUE: 30
PIF_VALUE: 28
PIF_VALUE: 29
PIF_VALUE: 2
PIF_VALUE: 25
PIF_VALUE: 25
PIF_VALUE: 27
PIF_VALUE: 13
PIF_VALUE: 28
PIF_VALUE: 27
PIF_VALUE: 29
PIF_VALUE: 11
PIF_VALUE: 29
PIF_VALUE: 30
PIF_VALUE: 11
PIF_VALUE: 23
PIF_VALUE: 0
PIF_VALUE: 27
PIF_VALUE: 11
PIF_VALUE: 25
PIF_VALUE: 26
PIF_VALUE: 29
PIF_VALUE: 27
PIF_VALUE: 2
PIF_VALUE: 25
PIF_VALUE: 11
PIF_VALUE: 10
PIF_VALUE: 27
PIF_VALUE: 29
PIF_VALUE: 4
PIF_VALUE: 0
PIF_VALUE: 11
PIF_VALUE: 0

## 2020-10-20 ASSESSMENT — PAIN SCALES - GENERAL
PAINLEVEL_OUTOF10: 2
PAINLEVEL_OUTOF10: 4
PAINLEVEL_OUTOF10: 4
PAINLEVEL_OUTOF10: 0
PAINLEVEL_OUTOF10: 4
PAINLEVEL_OUTOF10: 1
PAINLEVEL_OUTOF10: 4
PAINLEVEL_OUTOF10: 4

## 2020-10-20 ASSESSMENT — PAIN - FUNCTIONAL ASSESSMENT
PAIN_FUNCTIONAL_ASSESSMENT: ACTIVITIES ARE NOT PREVENTED
PAIN_FUNCTIONAL_ASSESSMENT: PREVENTS OR INTERFERES SOME ACTIVE ACTIVITIES AND ADLS
PAIN_FUNCTIONAL_ASSESSMENT: 0-10

## 2020-10-20 ASSESSMENT — PAIN DESCRIPTION - PROGRESSION
CLINICAL_PROGRESSION: GRADUALLY IMPROVING
CLINICAL_PROGRESSION: NOT CHANGED

## 2020-10-20 ASSESSMENT — PAIN DESCRIPTION - PAIN TYPE
TYPE: SURGICAL PAIN
TYPE: ACUTE PAIN;SURGICAL PAIN
TYPE: ACUTE PAIN;SURGICAL PAIN
TYPE: SURGICAL PAIN
TYPE: ACUTE PAIN;SURGICAL PAIN

## 2020-10-20 ASSESSMENT — PAIN DESCRIPTION - DESCRIPTORS
DESCRIPTORS: DISCOMFORT
DESCRIPTORS: PRESSURE
DESCRIPTORS: ACHING;DISCOMFORT
DESCRIPTORS: PRESSURE
DESCRIPTORS: SORE
DESCRIPTORS: PRESSURE

## 2020-10-20 ASSESSMENT — PAIN DESCRIPTION - FREQUENCY
FREQUENCY: CONTINUOUS
FREQUENCY: INTERMITTENT

## 2020-10-20 ASSESSMENT — PAIN DESCRIPTION - LOCATION
LOCATION: LEG
LOCATION: KNEE

## 2020-10-20 ASSESSMENT — PAIN DESCRIPTION - ORIENTATION
ORIENTATION: LEFT

## 2020-10-20 ASSESSMENT — PAIN DESCRIPTION - ONSET
ONSET: GRADUAL
ONSET: ON-GOING

## 2020-10-20 NOTE — FLOWSHEET NOTE
Patient admitted to room 2041 per bed from PACU. Assessment and orientation to the room and call system completed.   SO at bedside

## 2020-10-20 NOTE — PROGRESS NOTES
Physical Therapy  Facility/Department: [facility] MED SURG  Daily Treatment Note  NAME: Andre Lam  : 1960  MRN: [id number]    Date of Service: 10/20/2020    Discharge Recommendations:  Home with Home health PT, Home with assist PRN, Patient would benefit from continued therapy after discharge   PT Equipment Recommendations  Other: Pt reports she has a borrowed RW at home    Assessment   Body structures, Functions, Activity limitations: Decreased functional mobility ; Decreased ROM; Decreased strength;Decreased balance  Assessment: Pt here for L TKA, pt needs SBA for mobility with RW at this time. Will benefit from continued therapy at home. Treatment Diagnosis: Impaired mobilit  Specific instructions for Next Treatment: Gait and steps  Prognosis: Good  Decision Making: Medium Complexity  REQUIRES PT FOLLOW UP: Yes  Activity Tolerance  Activity Tolerance: Patient Tolerated treatment well     Patient Diagnosis(es): The encounter diagnosis was Primary osteoarthritis of left knee. has a past medical history of Arthritis, HTN (hypertension), and Sleep apnea. has a past surgical history that includes Ectopic pregnancy surgery and Total knee arthroplasty (Left, 10/20/2020). Restrictions  Restrictions/Precautions  Restrictions/Precautions: Weight Bearing, Fall Risk  Required Braces or Orthoses?: No  Implants present? : Metal implants(L TKA)  Subjective   General  Additional Pertinent Hx: L TKA 10/20/20  Family / Caregiver Present: No  Referring Practitioner: Dr Manuelito Monzon: Patient seated in recliner upon arrival. Pt is pleasant and motivated for therapy. Pt left seated in recliner with B LE elevated and cryocuff applied to L knee. General Comment  Comments: JASVIR jauregui's pt for PT.   Pain Assessment  Pain Assessment: 0-10  Pain Level: 1  Pain Type: Acute pain;Surgical pain  Pain Location: Knee  Pain Orientation: Left  Pain Descriptors: Sore  Non-Pharmaceutical Pain Intervention(s): Elevation;Cold applied  Response to Pain Intervention: Patient Satisfied       Orientation  Orientation  Overall Orientation Status: Within Functional Limits  Cognition      Objective   Bed mobility  Comment: Unable to assess. Pt up in recliner start and end of session. Transfers  Sit to Stand: Stand by assistance  Stand to sit: Stand by assistance  Comment: Used rolling walker. Transfers from recliner and chair. SBA with min cues needed for safe technique. Ambulation  Ambulation?: Yes  WB Status: WBAT L L E  Ambulation 1  Surface: level tile  Device: Rolling Walker  Assistance: Stand by assistance  Quality of Gait: slow pace, steady, no LOB  Gait Deviations: Decreased step length;Decreased step height;Slow Jeniffer  Distance: 120 ft x 2  Comments: Cues for posture and heel/toe technique with good carryover. Cues for technique with rolling walker with fair return. Stairs/Curb  Stairs?: Yes  Stairs  # Steps : 6  Stairs Height: 4\"  Rails: Left ascending(B UE support)  Device: No Device  Assistance: Contact guard assistance  Comment: Writer demo and explanation prior to stair attempt. Pt demos good technique of side stepping ascend/descend. Steady, no LOB. Balance  Posture: Good  Sitting - Static: Good  Sitting - Dynamic: Good;-  Standing - Static: Good  Standing - Dynamic: Good;-  Other exercises  Other exercises 1: Issued and demonstrated/reviewed HEP protocol for TKA with good understanding. All questions answered. Other exercises 2: Sit to stands from recliner and chair  Other exercises 3: Writer explanation and demonstration of cryocuff steps and application. Pt with B LE elevated in recliner and cryocuff applied to L knee end of session.    PROM LLE (degrees)  LLE General PROM: Left knee flexion 9 to 84 degrees       Goals  Short term goals  Time Frame for Short term goals: 2 treatments  Short term goal 1: Pt able to ambulate  with RW dist of 120 ft x 2, SBA`  Short term goal 2: Pt able to go up and down 4 steps with 1 HR, CGA. Short term goal 3: Review HEP for TKA to conitinue at home. Patient Goals   Patient goals : Get Left knee moving better with less pain.     Plan    Plan  Times per week: BID  Specific instructions for Next Treatment: Gait and steps  Current Treatment Recommendations: Strengthening, ROM, Balance Training, Functional Mobility Training, Transfer Training, Gait Training, Stair training, Home Exercise Program, Safety Education & Training, Patient/Caregiver Education & Training, Equipment Evaluation, Education, & procurement  Safety Devices  Type of devices: Left in chair, Gait belt, Call light within reach     Therapy Time   Individual Concurrent Group Co-treatment   Time In 1504         Time Out 1533         Minutes [facility] [place], [place]

## 2020-10-20 NOTE — PROGRESS NOTES
accessible, Accessible  Home Equipment: Rolling walker, Standard walker, Reacher(Borrowed RW)  ADL Assistance: Independent  Homemaking Assistance: Independent  Ambulation Assistance: Independent  Transfer Assistance: Independent  Active : Yes  Mode of Transportation: SUV  Occupation: Full time employment  Type of occupation: -[employer]  Additional Comments: Pt primary person for all home making chores. Spouse retired and will be assisting pt. Pain Assessment  Pain Assessment: 0-10  Pain Level: 4  Pain Type: Acute pain, Surgical pain  Pain Location: Knee  Pain Orientation: Left  Pain Descriptors: Discomfort  Pain Frequency: Intermittent(Worse with bending knee, less so when walking)  Clinical Progression: Not changed  Response to Pain Intervention: Patient Satisfied  Multiple Pain Sites: No    Objective  Vision - Basic Assessment  Patient Visual Report: No visual complaint reported. Cognition  Overall Cognitive Status: WFL   Perception  Overall Perceptual Status: WFL  Sensation  Overall Sensation Status: WFL   ADL  Feeding: Independent  Grooming: Supervision(Standing at sink to wash her hands)  UE Bathing: Setup  LE Bathing: Minimal assistance(A for feet)  UE Dressing: Setup  LE Dressing: Minimal assistance(Incidental A w/threading L leg; A for socks)  Toileting: Stand by assistance  Additional Comments: Pt donned bra, shirt, underwear, and pants at above levels. Pt then ambulated to bathroom and completed toilet transfer and toileting with RW, SBA, and occasional cues for safety. Other areas based on pt report, observation, and clinical reasoning.     UE Function  LUE Strength  Gross LUE Strength: WFL     LUE Tone: Normotonic     LUE AROM (degrees)  LUE AROM : WFL     Left Hand AROM (degrees)  Left Hand AROM: WFL  RUE Strength  Gross RUE Strength: WFL      RUE Tone: Normotonic     RUE AROM (degrees)  RUE AROM : WFL     Right Hand AROM (degrees)  Right Hand AROM: WFL    Fine Motor Skills  Coordination  Movements Are Fluid And Coordinated: Yes     Mobility  Balance  Sitting Balance: Independent  Standing Balance: Stand by assistance(w/rolling walker)     Functional Mobility  Functional - Mobility Device: Rolling Walker  Activity: To/from bathroom  Assist Level: Stand by assistance  Functional Mobility Comments: Occasional cues for safety/technique with walker  Bed mobility  Comment: Pt up in recliner at start/end of session. Transfers  Stand Step Transfers: Stand by assistance  Stand Pivot Transfers: Stand by assistance  Sit to stand: Stand by assistance  Stand to sit: Stand by assistance  Transfer Comments: w/rolling walker; occasional cues for safety and technique  Toilet Transfers  Toilet - Technique: Ambulating  Equipment Used: Raised toilet seat with rails  Toilet Transfer: Stand by assistance  Toilet Transfers Comments: w/rolling walker  Functional Activity Tolerance  Functional Activity Tolerance: Tolerates 10 - 20 min exercise with multiple rests   Assessment  Performance deficits / Impairments: Decreased functional mobility , Decreased ADL status, Decreased endurance, Decreased balance, Decreased high-level IADLs  Treatment Diagnosis: Impaired self-care status  Prognosis: Good  Decision Making: Low Complexity  REQUIRES OT FOLLOW UP: Yes  Activity Tolerance: Patient Tolerated treatment well    Goals  Patient Goals   Patient goals : Pt planning on discharging home with [facility] services and A from spouse as needed. Short term goals  Time Frame for Short term goals: By Discharge  Short term goal 1: Pt will complete LB ADL's with set-up A only using adaptive equipment/modified techniques as needed. Short term goal 2: Pt will complete toilet transfer and toileting with Modified IND and Good safety with rolling walker.   Short term goal 3: Pt will actively participate in 15-20 minutes of therapeutic exercise/activity with focus on standing balance/tolerance to promote increased independence and safety with self-care and functional mobility.     Plan  Safety Devices  Safety Devices in place: Yes  Type of devices: Patient at risk for falls, Gait belt, Left in chair, Call light within reach, Nurse notified     Plan  Times per week: 5-7(BID)  Times per day: Twice a day  Current Treatment Recommendations: Balance Training, Functional Mobility Training, Endurance Training, Pain Management, Safety Education & Training, Patient/Caregiver Education & Training, Equipment Evaluation, Education, & procurement, Self-Care / ADL, Home Management Training    Equipment Recommendations  Equipment Needed: Yes  Shower Chair: Pt may benefit from shower chair, to be addressed with [facility]  OT Individual Minutes  Time In: 1410  Time Out: Jay Watts 42  Minutes: 34    Electronically signed by Adrien Mojica on 10/20/20 at 2:55 PM EDT

## 2020-10-20 NOTE — ANESTHESIA PRE PROCEDURE
Department of Anesthesiology  Preprocedure Note       Name:  Ishaan Rodas   Age:  56 y.o.  :  1960                                          MRN:  [id number]         Date:  10/20/2020      Surgeon: Keith Wahl):  Latosha Landers MD    Procedure: Procedure(s):  KNEE TOTAL ARTHROPLASTY    Medications prior to admission:   Prior to Admission medications    Medication Sig Start Date End Date Taking? Authorizing Provider   losartan (COZAAR) 50 MG tablet Take 50 mg by mouth daily   Yes Historical Provider, MD   hydrochlorothiazide (MICROZIDE) 12.5 MG capsule Take 12.5 mg by mouth daily    Historical Provider, MD   meloxicam (MOBIC) 15 MG tablet Take 15 mg by mouth daily    Historical Provider, MD   atenolol (TENORMIN) 50 MG tablet Take 50 mg by mouth daily    Historical Provider, MD       Current medications:    Current Facility-Administered Medications   Medication Dose Route Frequency Provider Last Rate Last Dose    sodium chloride flush 0.9 % injection 10 mL  10 mL Intravenous 2 times per day Isai Carey MD        sodium chloride flush 0.9 % injection 10 mL  10 mL Intravenous PRN Isai Carey MD        lidocaine PF 1 % injection 1 mL  1 mL Intradermal Once PRN Isai Carey MD        lactated ringers infusion   Intravenous Continuous Isai Carey  mL/hr at 10/20/20 0739      dexamethasone (DECADRON) injection 10 mg  10 mg Intravenous Once Latosha Landers MD        acetaminophen (TYLENOL) tablet 1,000 mg  1,000 mg Oral Once Latosha Landers MD        gabapentin (NEURONTIN) tablet 600 mg  600 mg Oral Once Latosha Landers MD        scopolamine (TRANSDERM-SCOP) transdermal patch 1 patch  1 patch Transdermal Once Latosha Landers MD        vancomycin (VANCOCIN) 2,000 mg in dextrose 5 % 500 mL IVPB  2,000 mg Intravenous Once Latosha Landers  mL/hr at 10/20/20 0753 2,000 mg at 10/20/20 0753       Allergies:     Allergies   Allergen Reactions    Lisinopril Other (See Comments)     Cough hours.    Coags: No results found for: PROTIME, INR, APTT    HCG (If Applicable): No results found for: PREGTESTUR, PREGSERUM, HCG, HCGQUANT     ABGs: No results found for: PHART, PO2ART, YPC6DNP, LHB9BNM, BEART, M3IGXTEO     Type & Screen (If Applicable):  No results found for: LABABO, LABRH    Drug/Infectious Status (If Applicable):  No results found for: HIV, HEPCAB    COVID-19 Screening (If Applicable):   Lab Results   Component Value Date    COVID19 Not Detected 10/16/2020         Anesthesia Evaluation  Patient summary reviewed and Nursing notes reviewed no history of anesthetic complications:   Airway: Mallampati: III  TM distance: >3 FB   Neck ROM: full  Mouth opening: > = 3 FB Dental: normal exam         Pulmonary:normal exam  breath sounds clear to auscultation  (+) sleep apnea: on CPAP,                             Cardiovascular:    (+) hypertension:,       ECG reviewed  Rhythm: regular  Rate: normal           Beta Blocker:  Dose within 24 Hrs         Neuro/Psych:   Negative Neuro/Psych ROS              GI/Hepatic/Renal:   (+) morbid obesity          Endo/Other:    (+) : arthritis: OA., .                 Abdominal:           Vascular: negative vascular ROS. Anesthesia Plan      general and regional     ASA 3     (Adductor canal block  Post op)  Induction: intravenous. MIPS: Prophylactic antiemetics administered. Anesthetic plan and risks discussed with patient. Plan discussed with CRNA.                   Gracie Mayes MD   10/20/2020

## 2020-10-20 NOTE — CARE COORDINATION
Continuity of Care Form    Patient Name: Lucas Walls   :  1960  MRN:  [id number]    Admit date:  10/20/2020  Discharge date:  10/20/2020    Code Status Order: Full Code   Advance Directives:      Admitting Physician:  Isha Sheffield MD  PCP: Niki Houser    Discharging Nurse: Lexi Dewitt [facility] Unit/Room#: [facility]  Discharging Unit Phone Number: [phone]    Emergency Contact:   Extended Emergency Contact Information  Primary Emergency Contact: Rebecca Osman  Home Phone: [phone]  Relation: Domestic Partner    Past Surgical History:  Past Surgical History:   Procedure Laterality Date    ECTOPIC PREGNANCY SURGERY         Immunization History: There is no immunization history on file for this patient. Active Problems:  Patient Active Problem List   Diagnosis Code    Primary osteoarthritis of left knee M17.12       Isolation/Infection:   Isolation            No Isolation          Patient Infection Status       Infection Onset Added Last Indicated Last Indicated By Review Planned Expiration Resolved Resolved By    MRSA 10/06/20 10/07/20 10/06/20 MRSA DNA Probe, Nasal        Resolved    COVID-19 Rule Out 10/15/20 10/15/20 10/16/20 Covid-19 Ambulatory (Ordered)   10/18/20 Rule-Out Test Resulted            Nurse Assessment:  Last Vital Signs: BP (!) 144/73   Pulse 65   Temp 98.8 °F (37.1 °C) (Oral)   Resp 18   Ht 5' 3\" (1.6 m)   Wt 280 lb (127 kg)   SpO2 98%   BMI 49.60 kg/m²     Last documented pain score (0-10 scale): Pain Level: 0  Last Weight:   Wt Readings from Last 1 Encounters:   10/20/20 280 lb (127 kg)     Mental Status:  oriented and alert    IV Access:  - None    Nursing Mobility/ADLs:  Walking   Assisted  Transfer  Assisted  Bathing  Assisted  Dressing  Assisted  Toileting  Independent  Feeding  [address]  Independent  Med Delivery   whole    Wound Care Documentation and Therapy:        Elimination:  Continence:   · Bowel:  Yes  · Bladder: Yes  Urinary Catheter: None   Colostomy/Ileostomy/Ileal Conduit: No       Date of Last BM: 10/19/2020  No intake or output data in the 24 hours ending 10/20/20 0731  No intake/output data recorded. Safety Concerns: At Risk for Falls    Impairments/Disabilities:      Vision    Nutrition Therapy:  Current Nutrition Therapy:   - Oral Diet:  General    Routes of Feeding: Oral  Liquids: No Restrictions  Daily Fluid Restriction: no  Last Modified Barium Swallow with Video (Video Swallowing Test): not done    Treatments at the Time of Hospital Discharge:   Respiratory Treatments: N/A  Oxygen Therapy:  is not on home oxygen therapy. Ventilator:    - No ventilator support    Rehab Therapies: Physical Therapy and Occupational Therapy  Weight Bearing Status/Restrictions: No weight bearing restirctions  Other Medical Equipment (for information only, NOT a DME order):  walker  Other Treatments: Skilled Nursing Assessment, Medication Education and Monitoring    Patient is requesting Andrew Stern for PT at home. TOTAL JOINT REPLACEMENT PATIENT HOME HEALTH CARE PROTOCOL  This patient is a post-operative total joint replacement patient. Expectations for this patients care are as follows:    Initial RN Visit to establish care and verify patient meds plus daily PT for two weeks. Goals:   DailyTherapy for rehabilitative purposes for two weeks.  Increased level of activity and ambulation each day.  Well-controlled pain.  Free from infection.  Encourage patient to provide self-care when possible.  Ambulation with a rolling walker. Activity & Diet:   Therapy performed daily. (Instruct patient to take pain meds. 1 hour prior to therapy.)   Up in chair for all meals and majority of day.  Range of motion for TKA patients.  Hip precautions for EDMOND patients.    Incentive Spirometer: 3- 4 inhalations every twenty minutes, during the day, while awake, the first week home after discharge   Increase oral intake of fruits, fiber and water and take a daily stool softener to prevent constipation.  Consider stronger bowel protocol if no bowel movement is achieved after three days home.  Increase protein intake and reduce sugar intake to promote healing and prevent infection.  No pillow under the knee for TKA patients. [address] [place] go on in the a.m. and come off in the p.m. (Hand wash them every two or three days, roll in towel to remove most of moisture, and hang to dry.)    Incision Care:   Keep incisional dressing intact until seen and removed by surgeon, unless saturated, in which case, call surgeon and request instructions.  If dressing falls off, call surgeon.  If using the Prevena dressing, with battery pack, place battery pack in waterproof bag during shower. If using Aquacel dressing then dressing is waterproof and patient may shower.  Elevated the leg and ice the affected area four times a day, for twenty minutes each time and after every physical therapy session. Normal Conditions - Will improve with provided comfort measures and time:   Some swelling in the operative leg is normal - this should reduce over time.  Some post-operative pain is normal.  This will improve with prescribed medication, provided comfort measures and time.  Constipation related to pain medications & decreased mobility is a common occurrence. (Increase your fiber & water intake and take a stool softener.)    Slight warmth of operative site is normal and will diminish with time.  Fatigue and moderate pain after therapy is normal and will improve with time.  Numbness near the incision site is normal and will improve with time.     NOTE: Ensure/Remind patient to go to their follow-up appointment with their orthopedic surgeon, which is scheduled: 11/4 @ 8:35 AM with Dr. Chari Leach    Abnormal Conditions - When to call the Surgeon:   Increasing/excessive redness, warmth or swelling at the incisional site not relieved with ice and elevation.  Increasing/excessive pain not well-controlled by prescribed medications.  Drainage or odor from or around the incision site.  (A little blood showing through the bandage is ok, but active leaking, of any color, coming out of the bandage is NOT normal.)   Temperature above 101 degrees. (A mild temp of 99 - 100 is normal - if temp gets to 101 you may use Tylenol once - if it does not improve, you may use a 2nd dose of Tylenol after 5 hours - if temperature is still at or above 101 degrees then call the surgeon.)   Calf tenderness, swelling, or redness or numbness of the foot/lower leg.  Shortness of breath or chest pain.  Any other incision or surgical-related concerns.  CALL SURGEON with concerns PRIOR TO sending patient to hospital.        Patient's personal belongings (please select all that are sent with patient):  Glasses    RN SIGNATURE:  Electronically signed by Volodymyr Angeles RN on 10/20/20 at 2:59 PM EDT    CASE MANAGEMENT/SOCIAL WORK SECTION    Inpatient Status Date: 10/20/2020    Readmission Risk Assessment Score:  Readmission Risk              Risk of Unplanned Readmission:        0           Discharging to [facility]/ [facility]  Phone: [phone]  Fax [phone]      / signature: Electronically signed by Volodymyr Angeles RN on 10/20/20 at 2:56 PM EDT    PHYSICIAN SECTION    Prognosis: Good    Condition at Discharge: Stable    Rehab Potential (if transferring to Rehab): Good    Recommended Labs or Other Treatments After Discharge: N/A    Physician Certification: I certify the above information and transfer of Susi Martinez  is necessary for the continuing treatment of the diagnosis listed and that she requires [address] for greater 30 days.      Update Admission H&P: No change in H&P    PHYSICIAN SIGNATURE:  Electronically signed by Jeff Pedroza MD on 10/20/20 at 7:31 AM EDT                      Current Discharge Medication List     START taking these medications     Medication  Dose    oxyCODONE-acetaminophen (PERCOCET) 5-325 MG per tablet  1-2 tablets    Take 1-2 tablets by mouth every 4 hours as needed for Pain for up to 7 days.     Quantity: 42 tablet  Refills: 0        Comments: Reduce doses taken as pain becomes manageable       Aspirin Buf,MdCfb-NeJlg-IkElp, (BUFFERED ASPIRIN) 325 MG TABS  325 mg    Take 1 tablet by mouth 2 times daily    Quantity: 30 tablet  Refills: 3            CONTINUE these medications which have NOT CHANGED     Medication  Dose    losartan (COZAAR) 50 MG tablet  50 mg    Take 50 mg by mouth daily        hydrochlorothiazide (MICROZIDE) 12.5 MG capsule  12.5 mg    Take 12.5 mg by mouth daily        atenolol (TENORMIN) 50 MG tablet  50 mg    Take 50 mg by mouth daily            STOP taking these previous medications     Medication  Dose  Reason for Stopping  Comments    (STOP TAKING) meloxicam (MOBIC) 15 MG tablet  15 mg

## 2020-10-20 NOTE — ANESTHESIA PROCEDURE NOTES
Peripheral Block    Patient location during procedure: PACU  Start time: 10/20/2020 11:03 AM  End time: 10/20/2020 11:08 AM  Staffing  Anesthesiologist: Edi Esquivel MD  Performed: anesthesiologist   Preanesthetic Checklist  Completed: patient identified, site marked, surgical consent, pre-op evaluation, timeout performed, IV checked, risks and benefits discussed, monitors and equipment checked, anesthesia consent given, oxygen available and patient being monitored  Peripheral Block  Patient position: supine  Prep: ChloraPrep  Patient monitoring: cardiac monitor, continuous pulse ox, frequent blood pressure checks and IV access  Block type: Saphenous  Laterality: left  Injection technique: single-shot  Procedures: ultrasound guided  Local infiltration: lidocaine  Infiltration strength: 1 %  Dose: 4 mL  Provider prep: mask and sterile gown  Local infiltration: lidocaine  Needle  Needle type: short-bevel   Needle gauge: 20 G  Needle length: 15 cm  Needle localization: ultrasound guidance  Test dose: negative  Assessment  Injection assessment: negative aspiration for heme, no paresthesia on injection and local visualized surrounding nerve on ultrasound  Paresthesia pain: none  Slow fractionated injection: yes  Hemodynamics: stable  Medications Administered  Bupivacaine (MARCAINE) PF injection 0.5%, 10 mL  bupivacaine liposome (EXPAREL) injection 1.3%, 10 mL  Reason for block: post-op pain management and at surgeon's request

## 2020-10-20 NOTE — OP NOTE
Operative Note      Patient: Twyla Lam  YOB: 1960  MRN: [id number]    Date of Procedure: 10/20/2020    Pre-Op Diagnosis: LEFT KNEE DJD    Post-Op Diagnosis: Same       Procedure(s):  KNEE TOTAL ARTHROPLASTY Left    Surgeon(s):  Isha Sheffield MD    Assistant:   Resident: DO Alexx Choudhury CST     anesthesia: General    Estimated Blood Loss (mL): Minimal    Complications: None    Specimens:   * No specimens in log *    Implants:  Implant Name Type Inv. Item Serial No.  Lot No. LRB No. Used Action   IMPL KNEE FEM COMP INTERLOK LT 62.5MM Knee IMPL KNEE FEM COMP INTERLOK LT 62.5MM  [employer] [id number] Left 1 Implanted   PLATE KNEE TIB TRAY CRUC COBLT 71MM Knee PLATE KNEE TIB TRAY CRUC COBLT 71MM  [employer] [id number] Left 1 Implanted   IMPL KNEE PATELLA SERIES A STD 3 31X8MM Knee IMPL KNEE PATELLA SERIES A STD 3 31X8MM  [employer] [id number] Left 1 Implanted   IMPL KNEE TIB BEARING ANT VANGRD [id number] Knee IMPL KNEE TIB BEARING ANT VANGRD [id number]  [employer] [id number] Left 1 Implanted         Drains: * No LDAs found *    Findings: DJD left knee    Detailed Description of Procedure:     Patient is a 56 y.o. female with a long standing history of left DJD of the knee. Patient has failed all types of conservative treatment included physical therapy, weight-loss counseling, NSAIDS, and injections. The patient has significant restriction of activities of daily living and/or work/job place abilities, and, therefore, has been counseled for TKA. Patient is aware of all the risks and benefits as highlighted in the surgical consent form. The patient was given 2 grams of vancomycin in the holding area as well as an adductor canal block. The patient was then taken to the operating suite where general anesthesia was administered. A tourniquet was applied to effected leg and then prepped and draped in the usual sterile fashion. Time out was called to verify laterality.  The leg was examined and the tourniquet inflated to 350 mm of Hg. Midline incision was utilized and taken down to the joint capsule where a medial parapatellar approach to the knee was performed. After a complete synovectomy, the patella was elevated, calibrated for thickness, and the above sized, patellar triple pegged drills guide positioned medially and then drilled. Atrial patellar button was positioned in order to re-established the original thickness. This was then replaced with a protective patellar plate. The knee was then flexed and revealed significant  arthrosis. Osteophytes were removed via rongeur. A drill hole was made in the distal femur and the femoral canal was then irrigated and suctioned. A fluted IM deisy was inserted and a distal femoral cut was made at 5 degrees of valgus at the +2 setting. The proximal tibia was then exposed after resection of the ACL and lateral meniscus. An extra-medullary tibial cutting jug was then aligned in reference to the tibia tubercle and ankle mortise and positional with a slight posterior slope. The cut was made with minimal cutting of the lowest depth of the tibial wear and the fragment removed. The distal femur was then approached and femoral sizing guide with 3 degrees of external rotation was placed flush with the surface and drill holes made and femoral size determined. The appropriate size cutting jig was then placed and anterior, posterior, and chamfer cuts made with an oscillating saw. A laminar  was inserted with care to avoid damaging the MCL. Posterior osteophytes were removed and the capsule stripped from the posterior femoral condyles. The capsule was then injected with the orthopedic cocktail at this time. The tibial cut was then assessed with a baseplate and alignment deisy to assure proper cut.  Spacer blocks were then inserted and the knee was assessed to determine the amount of soft tissue release and osteophyte removal necessary  to establish symmetric flexion and extension gaps. The tibia was then elevated, and the above sized tibial plate was positioned for proper external rotation with an alignment deisy down the middle-third of the tibial tubercles. This was pinned in place, the proximal tibialis reamed, the fins were then repacked. The appropriate size femur was positioned and various size of the polyethylene trials were inserted. The knee was assessed for  ROM and patellar tracking. Satisfied with this, this distal femoral logs were drilled and then all the trial components were removed. The knee was irrigated and dried while the cement was prepared. Cement was applied to to both implant and cut surfaces and the implants impacted and the compression with one size larger poly inserted and excess cement removed. The patella was placed and compressed as well. The knee was placed in full extension and then injected with the remainder  of the 100ml of the orthopedic cocktail. The Irrisept lavage was carried out for the 1 minutes. This was then irrigated and a permanent polyethylene was insert was injected with platelet rich/poor plasma and the tourniquet was released at 53 minutes. Hemostasis was excellent. The knee was closed with a #1 Strata-Fix and which was reinforced with #5 FiberWire in the superior medial capsular area. The subcutaneous tissue closed with a 2-0 Monocryl Strata-Fix  and then a Zip-line used for the skin. This was covered with adaptic and Aquacel dressing and dressed with a large 6-inch Ace dressing from toes to mid-thigh. The patient was awakened and taken to PACU in good condition.      Electronically signed by Nela Cuellar MD on 10/20/2020 at 10:23 AM

## 2020-10-20 NOTE — FLOWSHEET NOTE
Discharge instructions reviewed with the patient and her SO. Anti embolism hose not available tin the required size. Ace wraps sent home with the patient. Patient assisted to the car and tolerated well.

## 2020-10-20 NOTE — PROGRESS NOTES
CLINICAL PHARMACY NOTE: MEDS TO [address] Select Patient?: No  Total # of Prescriptions Filled: 2   The following medications were delivered to the patient:  · Percocet  · Asprin  Total # of Interventions Completed: 0  Time Spent (min): 30    Additional Documentation:

## 2020-10-20 NOTE — DISCHARGE INSTR - COC
Yes  Urinary Catheter: None   Colostomy/Ileostomy/Ileal Conduit: No       Date of Last BM: 10/19/2020  No intake or output data in the 24 hours ending 10/20/20 0731  No intake/output data recorded. Safety Concerns: At Risk for Falls    Impairments/Disabilities:      Vision    Nutrition Therapy:  Current Nutrition Therapy:   - Oral Diet:  General    Routes of Feeding: Oral  Liquids: No Restrictions  Daily Fluid Restriction: no  Last Modified Barium Swallow with Video (Video Swallowing Test): not done    Treatments at the Time of Hospital Discharge:   Respiratory Treatments: N/A  Oxygen Therapy:  is not on home oxygen therapy. Ventilator:    - No ventilator support    Rehab Therapies: Physical Therapy and Occupational Therapy  Weight Bearing Status/Restrictions: No weight bearing restirctions  Other Medical Equipment (for information only, NOT a DME order):  walker  Other Treatments: Skilled Nursing Assessment, Medication Education and [address]  This patient is a post-operative total joint replacement patient. Expectations for this patients care are as follows:    Initial RN Visit to establish care and verify patient meds plus daily PT for two weeks. Goals:   DailyTherapy for rehabilitative purposes for two weeks.  Increased level of activity and ambulation each day.  Well-controlled pain.  Free from infection.  Encourage patient to provide self-care when possible.  Ambulation with a rolling walker. Activity & Diet:   Therapy performed daily. (Instruct patient to take pain meds. 1 hour prior to therapy.)   Up in chair for all meals and majority of day.  Range of motion for TKA patients.  Hip precautions for EDMOND patients.    Incentive Spirometer: 3- 4 inhalations every twenty minutes, during the day, while awake, the first week home after discharge   Increase oral intake of fruits, fiber and water and take a daily stool softener to prevent constipation.  Consider stronger bowel protocol if no bowel movement is achieved after three days home.  Increase protein intake and reduce sugar intake to promote healing and prevent infection.  No pillow under the knee for TKA patients. [address] [place] go on in the a.m. and come off in the p.m. (Hand wash them every two or three days, roll in towel to remove most of moisture, and hang to dry.)    Incision Care:   Keep incisional dressing intact until seen and removed by surgeon, unless saturated, in which case, call surgeon and request instructions.  If dressing falls off, call surgeon.  If using the Prevena dressing, with battery pack, place battery pack in waterproof bag during shower. If using Aquacel dressing then dressing is waterproof and patient may shower.  Elevated the leg and ice the affected area four times a day, for twenty minutes each time and after every physical therapy session. Normal Conditions - Will improve with provided comfort measures and time:   Some swelling in the operative leg is normal - this should reduce over time.  Some post-operative pain is normal.  This will improve with prescribed medication, provided comfort measures and time.  Constipation related to pain medications & decreased mobility is a common occurrence. (Increase your fiber & water intake and take a stool softener.)    Slight warmth of operative site is normal and will diminish with time.  Fatigue and moderate pain after therapy is normal and will improve with time.  Numbness near the incision site is normal and will improve with time.  NOTE: Ensure/Remind patient to go to their follow-up appointment with their orthopedic surgeon, which is scheduled: 11/4 @ 8:35 AM with Dr. Addie Call    Abnormal Conditions - When to call the Surgeon:   Increasing/excessive redness, warmth or swelling at the incisional site not relieved with ice and elevation.    Increasing/excessive pain not well-controlled by prescribed medications.  Drainage or odor from or around the incision site.  (A little blood showing through the bandage is ok, but active leaking, of any color, coming out of the bandage is NOT normal.)   Temperature above 101 degrees. (A mild temp of 99 - 100 is normal - if temp gets to 101 you may use Tylenol once - if it does not improve, you may use a 2nd dose of Tylenol after 5 hours - if temperature is still at or above 101 degrees then call the surgeon.)   Calf tenderness, swelling, or redness or numbness of the foot/lower leg.  Shortness of breath or chest pain.  Any other incision or surgical-related concerns.  CALL SURGEON with concerns PRIOR TO sending patient to hospital.        Patient's personal belongings (please select all that are sent with patient):  Caprice    RN SIGNATURE:  Electronically signed by Berta Crum RN on 10/20/20 at 2:59 PM EDT    CASE MANAGEMENT/SOCIAL WORK SECTION    Inpatient Status Date: 10/20/2020    Readmission Risk Assessment Score:  Readmission Risk              Risk of Unplanned Readmission:        0           Discharging to [facility]/ [facility]  Phone: [phone]  Fax [phone]      / signature: Electronically signed by Berta Crum RN on 10/20/20 at 2:56 PM EDT    PHYSICIAN SECTION    Prognosis: Good    Condition at Discharge: Stable    Rehab Potential (if transferring to Rehab): Good    Recommended Labs or Other Treatments After Discharge: N/A    Physician Certification: I certify the above information and transfer of Gertrude May  is necessary for the continuing treatment of the diagnosis listed and that she requires [address] for greater 30 days.      Update Admission H&P: No change in H&P    PHYSICIAN SIGNATURE:  Electronically signed by Sin Gray MD on 10/20/20 at 7:31 AM EDT

## 2020-10-20 NOTE — INTERVAL H&P NOTE
Update History & Physical    The patient's History and Physical of October 6, 2020 was reviewed with the patient and I examined the patient. There was no change. The surgical site was confirmed by the patient and me. Patient has been NPO since midnight. Patient has not been on any blood thinners for at least 5 days. Patient reports having and increase on one of her BP meds losartan, she tooks her meds with sip of water. Plan: The risks, benefits, expected outcome, and alternative to the recommended procedure have been discussed with the patient. Patient understands and wants to proceed with the procedure.      Electronically signed by JOJO Mcmahan CNP on 10/20/2020 at 6:54 AM

## 2020-10-20 NOTE — PROGRESS NOTES
Physical Therapy    Facility/Department: [facility] [place] MED SURG  Initial Assessment    NAME: Bishnu Lam  : 1960  MRN: [id number]    Date of Service: 10/20/2020    Discharge Recommendations:  Home with Home health PT, Home with assist PRN, Patient would benefit from continued therapy after discharge   PT Equipment Recommendations  Other: Pt reports she has a borrowed RW at home    Assessment   Body structures, Functions, Activity limitations: Decreased functional mobility ; Decreased ROM; Decreased strength;Decreased balance  Assessment: Pt here for L TKA, pt needs SBA for mobility with RW at this time. Will benefit from continued therapy at home. Treatment Diagnosis: Impaired mobilit  Specific instructions for Next Treatment: Gait and steps  Prognosis: Good  Decision Making: Medium Complexity  Exam: ROM, MMT, fucntion. REQUIRES PT FOLLOW UP: Yes  Activity Tolerance  Activity Tolerance: Patient Tolerated treatment well       Patient Diagnosis(es): The encounter diagnosis was Primary osteoarthritis of left knee. has a past medical history of Arthritis, HTN (hypertension), and Sleep apnea. has a past surgical history that includes Ectopic pregnancy surgery and Total knee arthroplasty (Left, 10/20/2020). Restrictions  Restrictions/Precautions  Restrictions/Precautions: Weight Bearing, Fall Risk  Required Braces or Orthoses?: No  Implants present? : Metal implants(L TKA 10/20/20)  Vision/Hearing  Vision: Impaired  Vision Exceptions: Wears glasses for reading  Hearing: Within functional limits     Subjective  General  Patient assessed for rehabilitation services?: Yes  Additional Pertinent Hx: L TKA 10/20/20  Referring Practitioner: Dr Julius Austin  Referral Date : 10/20/20  Diagnosis: OA Left knee, L TKA  Follows Commands: Within Functional Limits  Subjective  Subjective: Pt up in the chair, pleasnt, eager to get started.   Pain Screening  Patient Currently in Pain: Yes  Pain Assessment  Pain Assessment: 0-10  Pain Level: 4  Pain Type: Acute pain;Surgical pain  Pain Location: Knee  Pain Orientation: Left  Pain Descriptors: Aching;Discomfort  Pain Frequency: Intermittent  Pain Onset: On-going  Clinical Progression: Gradually improving  Functional Pain Assessment: Prevents or interferes some active activities and ADLs  Response to Pain Intervention: Patient Satisfied  Vital Signs  Patient Currently in Pain: Yes       Orientation  Orientation  Overall Orientation Status: Within Functional Limits  Social/Functional History  Social/Functional History  Lives With: Spouse  Type of Home: House  Home Layout: Two level, Able to Live on Main level with bedroom/bathroom, Laundry in basement  Home Access: Stairs to enter with rails  Entrance Stairs - Number of Steps: 4  Entrance Stairs - Rails: Both(wide rails)  Bathroom Shower/Tub: Tub/Shower unit, Curtain  Bathroom Toilet: Handicap height  Bathroom Equipment: Grab bars in shower  Bathroom Accessibility: Walker accessible, Accessible  Home Equipment: Rolling walker, Standard walker, Reacher(Borrowed RW)  ADL Assistance: Independent  Homemaking Assistance: Independent  Ambulation Assistance: Independent  Transfer Assistance: Independent  Active : Yes  Mode of Transportation: [facility]  Occupation: Full time employment  Type of occupation: -[employer]  Additional Comments: Pt primary person for all home making chores. Spouse retired and will be assisting pt.   Cognition        Objective          AROM RLE (degrees)  RLE AROM: WFL  PROM LLE (degrees)  LLE General PROM: Left knee flexion 9 to 84 degrees  Strength RLE  Strength RLE: WFL  Strength LLE  Comment: Left Knee grossly 3/5     Sensation  Overall Sensation Status: WFL  Bed mobility  Rolling to Left: Stand by assistance  Rolling to Right: Stand by assistance  Supine to Sit: Stand by assistance  Sit to Supine: Stand by assistance  Scooting: Stand by assistance  Transfers  Sit to Stand: Stand by assistance  Stand to sit: Stand by assistance  Bed to Chair: Stand by assistance  Comment: RW, toilet transfers SBA  Ambulation  Ambulation?: Yes  WB Status: WBAT L L E  Ambulation 1  Surface: level tile  Device: Rolling Walker  Assistance: Stand by assistance;Contact guard assistance  Quality of Gait: Needs cuse for safe use of RW, and sequencing, pt improved tech for RW use with practise. Pt initially CGA, proc=gressed to SBA with pracitse. Gait Deviations: Decreased step length;Decreased step height;Slow Jeniffer  Distance: 20 ft x 2     Balance  Posture: Good  Sitting - Static: Good  Sitting - Dynamic: Good;-  Standing - Static: Good(RW)  Standing - Dynamic: Good;-(RW)  Other exercises  Other exercises?: Yes  Other exercises 1: Reviewed HEP, importance of ice/elevation and avoiding sitting for porlonged times. Plan   Plan  Times per week: BID  Specific instructions for Next Treatment: Gait and steps  Current Treatment Recommendations: Strengthening, ROM, Balance Training, Functional Mobility Training, Transfer Training, Gait Training, Stair training, Home Exercise Program, Safety Education & Training, Patient/Caregiver Education & Training, Equipment Evaluation, Education, & procurement  Safety Devices  Type of devices: Left in chair, Gait belt, Call light within reach    G-Code       OutComes Score                                                  AM-PAC Score  AM-PAC Inpatient Mobility Raw Score : 21 (10/20/20 1456)  AM-PAC Inpatient T-Scale Score : 50.25 (10/20/20 1456)  Mobility Inpatient CMS 0-100% Score: 28.97 (10/20/20 1456)  Mobility Inpatient CMS G-Code Modifier : Rogerio Speaks (10/20/20 1456)          Goals  Short term goals  Time Frame for Short term goals: 2 treatments  Short term goal 1: Pt able to ambulate  with RW dist of 120 ft x 2, SBA`  Short term goal 2: Pt able to go up and down 4 steps with 1 HR, CGA. Short term goal 3: Review HEP for TKA to conitinue at home.   Patient Goals   Patient goals : Get Left knee moving better with less pain.        Therapy Time   Individual Concurrent Group Co-treatment   Time In 1409         Time Out 1440         Minutes 31         Timed Code Treatment Minutes: 12 Minutes       Alex Mansfield, PT

## 2020-11-04 ENCOUNTER — OFFICE VISIT (OUTPATIENT)
Dept: ORTHOPEDIC SURGERY | Age: 60
End: 2020-11-04

## 2020-11-04 PROCEDURE — 99024 POSTOP FOLLOW-UP VISIT: CPT | Performed by: ORTHOPAEDIC SURGERY

## 2020-11-04 RX ORDER — OXYCODONE HYDROCHLORIDE AND ACETAMINOPHEN 5; 325 MG/1; MG/1
1 TABLET ORAL EVERY 6 HOURS PRN
Qty: 28 TABLET | Refills: 0 | Status: SHIPPED | OUTPATIENT
Start: 2020-11-04 | End: 2020-11-11

## 2020-11-04 NOTE — PROGRESS NOTES
Sudhir Lucas M.D.            [address] [facility] [address] [facility], [address] [facility]           Dept Phone: [phone]           Dept Fax:  [phone]           Bashir Naqvi          Dept Phone: [phone]           Dept Fax:  [phone]      Chief Compliant:  Chief Complaint   Patient presents with    Post-Op Check     Lt TKA 10/20/20        History of Present Illness:  Patient returns today status post left TKA times 2 weeks. Patient has no major complaints     Review of Systems   Constitutional: Negative for fever, chills, sweats, recent injury, recent illness  Neurological: Negative for Headaches, numbness, or weakness. Integumentary: Negative for rash, itching, ecchymosis, or wounds. Musculoskeletal: Positive for Post-Op Check (Lt TKA 10/20/20)       Physical Exam:  Constitutional: Patient is oriented to person, place, and time. Patient appears well-developed and well nourished. Musculoskeletal: Normal gait. Motion 0-100 degrees with expected pain with ROM. No Calf tenderness, Negative Bryan's sign. Neurovascular intact. Neurological: Patient is alert and oriented to person, place, and time. Normal strenght. No sensory deficit. Skin: Skin is warm and dry. Incision is healing well without signs of redness or drainage  Nursing note and vitals reviewed. Labs and Imaging:     XR taken today:  Xr Knee Left (1-2 Views)    Result Date: 11/4/2020  X-rays taken today reviewed by me show standing AP of both knees and lateral of the left knee. Patient status post left total knee arthroplasty. Components appear in good position on both AP lateral views.   Patellar height is appropriate as well         Orders Placed This Encounter   Procedures   [address] [facility] Physical Therapy - [facility]     Referral Priority:   Routine     Referral Type:   Eval and Treat Referral Reason:   Specialty Services Required     Requested Specialty:   Physical Therapy     Number of Visits Requested:   1       Assessment and Plan:  1. Status post total left knee replacement    2. Primary osteoarthritis of left knee        2 weeks status post left TKA        This is a 56 y.o. female who presents to the clinic today status post left TKA. Continue anticoagulation. Transition to outpatient Pt. Restrictions given. RTO 5-6 weeks. Call if any problems/issues prior to that       Provider Attestation:  Cesario Ahumada, personally performed the services described in this documentation. All medical record entries made by the scribe were at my direction and in my presence. I have reviewed the chart and discharge instructions and agree that the records reflect my personal performance and is accurate and complete. Tanvir Flores MD. 11/04/20        Please note that this chart was generated using voice recognition Dragon dictation software. Although every effort was made to ensure the accuracy of this automated transcription, some errors in transcription may have occurred.

## 2020-11-09 ENCOUNTER — TELEPHONE (OUTPATIENT)
Dept: ORTHOPEDIC SURGERY | Age: 60
End: 2020-11-09

## 2020-12-11 ENCOUNTER — TELEPHONE (OUTPATIENT)
Dept: ORTHOPEDIC SURGERY | Age: 60
End: 2020-12-11

## 2020-12-11 NOTE — TELEPHONE ENCOUNTER
Oh De La Rosa from [address] [place] [address] called to inform of denial of CPT codes [phone] (vasopneumatic device), 36958 (physical therapy visits), [phone], [id number], [id number]. Reference number: [id number]. Call for a Edpf-qb-Ydnw review: [phone]    Fax documents for appeal: [phone]     Oddly enough, there is an approval letter scanned into the chart stating Physical Therapy (\"CPT Code [phone], 97\") is approved from 11/16/2020 to 2/13/2021. The second CPT code is a partial, as though someone forgot to finish typing it. Lizzy Weller states she does not know why there is an approval letter followed by this denial phone call, but that we should try to initiate a Syqr-gn-Eakq call.

## 2020-12-14 ENCOUNTER — OFFICE VISIT (OUTPATIENT)
Dept: ORTHOPEDIC SURGERY | Age: 60
End: 2020-12-14

## 2020-12-14 VITALS — TEMPERATURE: 97.4 F

## 2020-12-14 PROCEDURE — 99024 POSTOP FOLLOW-UP VISIT: CPT | Performed by: ORTHOPAEDIC SURGERY

## 2020-12-14 NOTE — PROGRESS NOTES
Bryan Pruitt M.D.            [address] [place] [address]., [address] [facility],[address], [address] [facility]           Dept Phone: [phone]           Dept Fax:  [phone]           Bashir Naqvi          Dept Phone: [phone]           Dept Fax:  [phone]      Chief Compliant:  Chief Complaint   Patient presents with    Post-Op Check     s/p lft TKA 10/20/20        History of Present Illness: This is a 56 y.o. female who presents to the clinic today for evaluation / follow up of that is post left total knee on 10/20/2020. She states basically overall she has no pain. She complains of \"stiffness \"she just completed her physical therapy and therapist said that she would likely do well with a home exercise program..       Review of Systems   Constitutional: Negative for fever, chills, sweats. Eyes: Negative for changes in vision, or pain. HENT: Negative for ear ache, epistaxis, or sore throat. Respiratory/Cardio: Negative for Chest pain, palpitations, SOB, or cough. Gastrointestinal: Negative for abdominal pain, N/V/D. Genitourinary: Negative for dysuria, frequency, urgency, or hematuria. Neurological: Negative for headache, numbness, or weakness. Integumentary: Negative for rash, itching, laceration, or abrasion. Musculoskeletal: Positive for Post-Op Check (s/p lft TKA 10/20/20)       Physical Exam:  Constitutional: Patient is oriented to person, place, and time. Patient appears well-developed and well nourished. HENT: Negative otherwise noted  Head: Normocephalic and Atraumatic  Nose: Normal  Eyes: Conjunctivae and EOM are normal  Neck: Normal range of motion Neck supple. Respiratory/Cardio: Effort normal. No respiratory distress. Musculoskeletal: Examination of patient's left knee notes her wound is pristine. Her motion is 0-115 degrees. She has good stability throughout good patellar tracking no calf tenderness. Neurological: Patient is alert and oriented to person, place, and time. Normal strenght. No sensory deficit. Skin: Skin is warm and dry  Psychiatric: Behavior is normal. Thought content normal.  Nursing note and vitals reviewed. Labs and Imaging:     XR taken today:  No results found. No orders of the defined types were placed in this encounter. Assessment and Plan:  1. Status post total left knee replacement            This is a 56 y.o. female who presents to the clinic today for evaluation / follow up of status post left total knee on 10/20/2020.      Past History:    Current Outpatient Medications:     Aspirin Buf,OhScl-DcBjh-VmCcy, (BUFFERED ASPIRIN) 325 MG TABS, Take 1 tablet by mouth 2 times daily, Disp: 30 tablet, Rfl: 3    losartan (COZAAR) 50 MG tablet, Take 50 mg by mouth daily, Disp: , Rfl:     hydrochlorothiazide (MICROZIDE) 12.5 MG capsule, Take 12.5 mg by mouth daily, Disp: , Rfl:     atenolol (TENORMIN) 50 MG tablet, Take 50 mg by mouth daily, Disp: , Rfl:   Allergies   Allergen Reactions    Lisinopril Other (See Comments)     Cough      Social History     Socioeconomic History    Marital status:      Spouse name: Not on file    Number of children: Not on file    Years of education: Not on file    Highest education level: Not on file   Occupational History    Not on file   Social Needs    Financial resource strain: Not on file    Food insecurity     Worry: Not on file     Inability: Not on file    Transportation needs     Medical: Not on file     Non-medical: Not on file   Tobacco Use    Smoking status: Never Smoker    Smokeless tobacco: Never Used   Substance and Sexual Activity    Alcohol use: Yes     Comment: social but rarely    Drug use: Never Please note that this chart was generated using voice recognition Dragon dictation software. Although every effort was made to ensure the accuracy of this automated transcription, some errors in transcription may have occurred.

## 2021-03-15 ENCOUNTER — OFFICE VISIT (OUTPATIENT)
Dept: ORTHOPEDIC SURGERY | Age: 61
End: 2021-03-15
Payer: COMMERCIAL

## 2021-03-15 VITALS — TEMPERATURE: 98 F

## 2021-03-15 DIAGNOSIS — M25.561 ACUTE PAIN OF RIGHT KNEE: Primary | ICD-10-CM

## 2021-03-15 PROCEDURE — 99213 OFFICE O/P EST LOW 20 MIN: CPT | Performed by: ORTHOPAEDIC SURGERY

## 2021-03-15 PROCEDURE — 20610 DRAIN/INJ JOINT/BURSA W/O US: CPT | Performed by: ORTHOPAEDIC SURGERY

## 2021-03-15 RX ORDER — BETAMETHASONE SODIUM PHOSPHATE AND BETAMETHASONE ACETATE 3; 3 MG/ML; MG/ML
12 INJECTION, SUSPENSION INTRA-ARTICULAR; INTRALESIONAL; INTRAMUSCULAR; SOFT TISSUE ONCE
Status: COMPLETED | OUTPATIENT
Start: 2021-03-15 | End: 2021-03-15

## 2021-03-15 RX ORDER — BUPIVACAINE HYDROCHLORIDE 5 MG/ML
2 INJECTION, SOLUTION PERINEURAL ONCE
Status: COMPLETED | OUTPATIENT
Start: 2021-03-15 | End: 2021-03-15

## 2021-03-15 RX ORDER — LIDOCAINE HYDROCHLORIDE 10 MG/ML
2 INJECTION, SOLUTION INFILTRATION; PERINEURAL ONCE
Status: COMPLETED | OUTPATIENT
Start: 2021-03-15 | End: 2021-03-15

## 2021-03-15 RX ADMIN — BUPIVACAINE HYDROCHLORIDE 10 MG: 5 INJECTION, SOLUTION PERINEURAL at 15:44

## 2021-03-15 RX ADMIN — BETAMETHASONE SODIUM PHOSPHATE AND BETAMETHASONE ACETATE 12 MG: 3; 3 INJECTION, SUSPENSION INTRA-ARTICULAR; INTRALESIONAL; INTRAMUSCULAR; SOFT TISSUE at 15:44

## 2021-03-15 RX ADMIN — LIDOCAINE HYDROCHLORIDE 2 ML: 10 INJECTION, SOLUTION INFILTRATION; PERINEURAL at 15:45

## 2021-03-15 NOTE — PROGRESS NOTES
Dana Gomez M.D.            [address] [facility] [address] [facility], [address] [facility]           Dept Phone: [phone]           Dept Fax:  [address], [place]          Dept Phone: [phone]           Dept Fax:  [phone]      Chief Compliant:  Chief Complaint   Patient presents with    Follow-up     S/P Left TKA 10/20/20    Follow-up     right knee pain         History of Present Illness: This is a 56 y.o. female who presents to the clinic today for evaluation / follow up of that is post left total knee on 10/20/2020. Patient states her left knee actually feels great. She is actually having more problems with her right knee. She is never had anything done for her right knee in the past.       Review of Systems   Constitutional: Negative for fever, chills, sweats. Eyes: Negative for changes in vision, or pain. HENT: Negative for ear ache, epistaxis, or sore throat. Respiratory/Cardio: Negative for Chest pain, palpitations, SOB, or cough. Gastrointestinal: Negative for abdominal pain, N/V/D. Genitourinary: Negative for dysuria, frequency, urgency, or hematuria. Neurological: Negative for headache, numbness, or weakness. Integumentary: Negative for rash, itching, laceration, or abrasion. Musculoskeletal: Positive for Follow-up (S/P Left TKA 10/20/20) and Follow-up (right knee pain )       Physical Exam:  Constitutional: Patient is oriented to person, place, and time. Patient appears well-developed and well nourished. HENT: Negative otherwise noted  Head: Normocephalic and Atraumatic  Nose: Normal  Eyes: Conjunctivae and EOM are normal  Neck: Normal range of motion Neck supple. Respiratory/Cardio: Effort normal. No respiratory distress.   Musculoskeletal: examination the patient's left knee notes her motion is excellent 0-125 degrees. She has good stability throughout good patellar tracking with no obvious discomfort. Brief examination the patient's right knee notes that she still has maintained good motion 0-120 degrees. She has some tenderness medially with some mild crepitus but nothing too severe ligamentously she is grossly intact. No obvious effusion is appreciated  Neurological: Patient is alert and oriented to person, place, and time. Normal strenght. No sensory deficit. Skin: Skin is warm and dry  Psychiatric: Behavior is normal. Thought content normal.  Nursing note and vitals reviewed. Labs and Imaging:   No x-rays were taken today but previous x-rays were available for review. Patient is status post left total knee components. Be good. Patient does have some fairly moderate to early significant medial joint space narrowing of her right knee. Orders Placed This Encounter   Procedures    20610 - DRAIN/INJECT LARGE JOINT BURSA       Assessment and Plan:  1. Acute pain of right knee    2. DJD right knee  3.       Previous left total knee arthroplasty 10/20/68144 status post left total knee    Administrations This Visit     betamethasone acetate-betamethasone sodium phosphate (CELESTONE) injection 12 mg     Admin Date  03/15/2021  15:44 Action  Given Dose  12 mg Route  Intra-articular Site  Knee Right Administered By  Marina Alvares LPN    Ordering Provider: Mariama Arana MD    NDC: 6616-8605-90    Lot#: [id number]    : [id number] [facility]    Patient Supplied?: No          bupivacaine (MARCAINE) 0.5 % injection 10 mg     Admin Date  03/15/2021  15:44 Action  Given Dose  10 mg Route  Intra-articular Site  Knee Right Administered By  Marina Alvares LPN    Ordering Provider: Mariama Arana MD    NDC: 3865-4376-73    Lot#: [id number]    : [facility]    Patient Supplied?: No          lidocaine 1 % injection 2 mL     Admin Date  03/15/2021  15:45 Action  Given Dose  2 mL Route  Intra-articular Site  Knee Right Administered By  Josafat Perkins LPN    Ordering Provider: Mahi Mendez MD    NDC: 5351-4346-57    Lot#: [id number]    : Doc Godoy    Patient Supplied?: No                This is a 56 y.o. female who presents to the clinic today for evaluation / follow up of status post left total knee.      Past History:    Current Outpatient Medications:     Aspirin Buf,MpVgs-TkOxk-SnAph, (BUFFERED ASPIRIN) 325 MG TABS, Take 1 tablet by mouth 2 times daily, Disp: 30 tablet, Rfl: 3    losartan (COZAAR) 50 MG tablet, Take 50 mg by mouth daily, Disp: , Rfl:     hydrochlorothiazide (MICROZIDE) 12.5 MG capsule, Take 12.5 mg by mouth daily, Disp: , Rfl:     atenolol (TENORMIN) 50 MG tablet, Take 50 mg by mouth daily, Disp: , Rfl:   Allergies   Allergen Reactions    Lisinopril Other (See Comments)     Cough      Social History     Socioeconomic History    Marital status:      Spouse name: Not on file    Number of children: Not on file    Years of education: Not on file    Highest education level: Not on file   Occupational History    Not on file   Social Needs    Financial resource strain: Not on file    Food insecurity     Worry: Not on file     Inability: Not on file    Transportation needs     Medical: Not on file     Non-medical: Not on file   Tobacco Use    Smoking status: Never Smoker    Smokeless tobacco: Never Used   Substance and Sexual Activity    Alcohol use: Yes     Comment: social but rarely    Drug use: Never    Sexual activity: Not on file   Lifestyle    Physical activity     Days per week: Not on file     Minutes per session: Not on file    Stress: Not on file   Relationships    Social connections     Talks on phone: Not on file     Gets together: Not on file     Attends Denominational service: Not on file     Active member of club or organization: Not on file     Attends meetings of clubs or organizations: Not on file     Relationship status: Not on file    Intimate partner violence     Fear of current or ex partner: Not on file     Emotionally abused: Not on file     Physically abused: Not on file     Forced sexual activity: Not on file   Other Topics Concern    Not on file   Social History Narrative    Not on file     Past Medical History:   Diagnosis Date    Arthritis     HTN (hypertension)     Sleep apnea     cpap     Past Surgical History:   Procedure Laterality Date    ECTOPIC PREGNANCY SURGERY      TOTAL KNEE ARTHROPLASTY Left 10/20/2020    KNEE TOTAL ARTHROPLASTY performed by Kofi Kenny MD at [address] [address] [facility]     No family history on file. Plan  An informed verbal consent for the procedure was obtained and risks including, but not limited to: allergy to medications, injection, bleeding, stiffness of joint, recurrence of symptoms, loss of function, swelling, drainage, irrigation, need for surgery and pseudo-septic inflammation, were explained to the patient. Also, discussed was the potential for further injections, irrigation and debridement and surgery. Alternate means of treatment have also been discussed with the patient.       Administrations This Visit     betamethasone acetate-betamethasone sodium phosphate (CELESTONE) injection 12 mg     Admin Date  03/15/2021  15:44 Action  Given Dose  12 mg Route  Intra-articular Site  Knee Right Administered By  Luis Enrique Cui LPN    Ordering Provider: Kofi Kenny MD    NDC: 3394-9371-62    Lot#: [id number]    : [id number] [facility]    Patient Supplied?: No          bupivacaine (MARCAINE) 0.5 % injection 10 mg     Admin Date  03/15/2021  15:44 Action  Given Dose  10 mg Route  Intra-articular Site  Knee Right Administered By  Luis Enrique Cui LPN    Ordering Provider: Kofi Kenny MD    NDC: 5920-9482-95    Lot#: [id number]    : Lara Diaz    Patient Supplied?: No          lidocaine 1 % injection 2 mL     Admin Date  03/15/2021  15:45 Action  Given Dose  2 mL Route  Intra-articular Site  Knee Right Administered By  Camacho Butler LPN    Ordering Provider: Dalila Villatoro MD    NDC: 5040-0905-99    Lot#: [id number]    : Ruth [facility]    Patient Supplied?: No            Under sterile conditions the patient's right knee was injected with lidocaine 2 cc bupivacaine 2 cc and Celestone 2 cc. She tolerated procedure well    We will see the patient back here in 3 months for reevaluation                  Provider Attestation:  Mayda Rosenbaum, personally performed the services described in this documentation. All medical record entries made by the scribe were at my direction and in my presence. I have reviewed the chart and discharge instructions and agree that the records reflect my personal performance and is accurate and complete. Dalila Villatoro MD. 03/15/21      Please note that this chart was generated using voice recognition Dragon dictation software. Although every effort was made to ensure the accuracy of this automated transcription, some errors in transcription may have occurred.

## 2021-06-16 ENCOUNTER — OFFICE VISIT (OUTPATIENT)
Dept: ORTHOPEDIC SURGERY | Age: 61
End: 2021-06-16
Payer: COMMERCIAL

## 2021-06-16 VITALS — WEIGHT: 280 LBS | HEIGHT: 63 IN | BODY MASS INDEX: 49.61 KG/M2

## 2021-06-16 DIAGNOSIS — M25.561 CHRONIC PAIN OF RIGHT KNEE: Primary | ICD-10-CM

## 2021-06-16 DIAGNOSIS — G89.29 CHRONIC PAIN OF RIGHT KNEE: Primary | ICD-10-CM

## 2021-06-16 DIAGNOSIS — M17.11 PRIMARY OSTEOARTHRITIS OF RIGHT KNEE: ICD-10-CM

## 2021-06-16 PROCEDURE — 20610 DRAIN/INJ JOINT/BURSA W/O US: CPT | Performed by: PHYSICIAN ASSISTANT

## 2021-06-16 RX ORDER — LIDOCAINE HYDROCHLORIDE 10 MG/ML
4 INJECTION, SOLUTION INFILTRATION; PERINEURAL ONCE
Status: COMPLETED | OUTPATIENT
Start: 2021-06-16 | End: 2021-06-16

## 2021-06-16 RX ORDER — TRIAMCINOLONE ACETONIDE 40 MG/ML
40 INJECTION, SUSPENSION INTRA-ARTICULAR; INTRAMUSCULAR ONCE
Status: COMPLETED | OUTPATIENT
Start: 2021-06-16 | End: 2021-06-16

## 2021-06-16 RX ADMIN — LIDOCAINE HYDROCHLORIDE 4 ML: 10 INJECTION, SOLUTION INFILTRATION; PERINEURAL at 16:28

## 2021-06-16 RX ADMIN — TRIAMCINOLONE ACETONIDE 40 MG: 40 INJECTION, SUSPENSION INTRA-ARTICULAR; INTRAMUSCULAR at 16:29

## 2021-06-17 NOTE — PROGRESS NOTES
[facility], [address] [place], [address] [place] [address], [address] [facility]           Dept Phone: [phone]           Dept Fax:  [address]           [place]          Dept Phone: [phone]           Dept Fax:  [phone]      Chief Compliant:  Chief Complaint   Patient presents with    Follow-up     SP L total knee 10/20/20    Knee Pain     Chronic right knee pain        History of Present Illness:  Marycruz Esteban returns today. This is a 56 y.o. female who presents to the clinic today for follow up of chronic right knee pain. Patient with history as DJD of this right knee underwent a corticosteroid injection on 3/15/2021 and believes this gave her over 2 months relief of pain. She is here to discuss possible repeat injection of this right knee. Patient is status post left total knee arthroplasty on 10/20/2020 states his knee is doing excellent today no complaints regards to her left knee. Patient denies any joint warmth, redness, fever or chills. Review of Systems   Constitutional: Negative for fever, chills, sweats, recent illness, or recent injury. Neurological: Negative for headaches, numbness, or weakness. Integumentary: Negative for rash, itching, ecchymosis, abrasions, or laceration. Musculoskeletal: Positive for Follow-up (SP L total knee 10/20/20)       Physical Exam:  Constitutional: Patient is oriented to person, place, and time. Patient appears well-developed and well nourished. Musculoskeletal:    Left Knee:     Skin: warm and dry, no rash or erythema  Vasculature: 2+ pedal pulses bilaterally  Neuro: Sensation grossly intact to light touch diffusely  Alignment: Genu Varum  Tenderness: Medial joint line. No tenderness to quad/patellar tendon, pes anserine bursa or posterior knee.   Effusion: None    ROM: (Degrees)       A P       Extension  -5        Flexion   115        Crepitation  Yes       Muscle strength:         Flexion   5      Extension  5      SLR   5        Extensor lag   y          Special testing:  y    Pain with deep knee flexion     y    Patellar grind       n    Patellar apprehension      n    Patellar glide         n    Lachman       n    Anterior drawer      n    Pivot shift       n    Posterior drawer      n    Dial test       n    Posterolateral drawer      n    Posterior Sag       n    MCL        n    LCL          y    Medial joint line tenderness     n    Lateral joint line tenderness     n    Appley's           Neurological: Patient is alert and oriented to person, place, and time. Normal strenght. No sensory deficit. Skin: Skin is warm and dry  Psychiatric: Behavior is normal. Thought content normal.  Nursing note and vitals reviewed. Labs and Imaging:     XR taken today:  No results found. Previous Imaging:  No new x-rays are taken in clinic today however those from 11/4/2020 of left knee do demonstrate AP view of right knee with moderate to early severe medial compartmental narrowing. Assessment and Plan:  1. Chronic pain of right knee    2. Primary osteoarthritis of right knee        Administrations This Visit     lidocaine 1 % injection 4 mL     Admin Date  06/16/2021  16:28 Action  Given Dose  4 mL Route  Intra-articular Site  Knee Right Administered By  Horacio Souza LPN    Ordering Provider: PIERRE Woodruff    [address]: 9289-7354-37    Lot#: [id number]    : YASIR    Patient Supplied?: No          triamcinolone acetonide (KENALOG-40) injection 40 mg     Admin Date  06/16/2021  16:29 Action  Given Dose  40 mg Route  Intra-articular Site  Knee Right Administered By  Horacio Souza LPN    Ordering Provider: PIERRE Woodruff    NDC: 8683-9219-85    Lot#: [id number]    : [facility] [place])    Patient Supplied?: No PLAN:  Serge Logan is a 56 y.o. old female with right knee osteoarthritis. I had a discussion with the patient with regards to the nature and extent of her problem. We also discussed treatment options available to her including non-operative and operative intervention. To this end we discussed use of NSAIDs, cortisone and viscosupplementation injections, weight loss, activity modification, physical therapy, bracing, and use of assistive walking devices. We also had discussions about total knee arthroplasties. As outlined above she has attempted treatment to include use of corticosteroid. At this time she would like to proceed with repeat corticosteroid injection. 1.  Kenalog injection given as outlined below  2. Patient educated on postinjection protocol  3. Patient doing excellent in regards to her left total knee. Recommend follow-up in 4 months as we can get repeat x-rays of the left knee and reevaluate the right knee at that time. Procedure Note: right Knee Kenalog Injection   An informed verbal consent for the procedure was obtained and risks including, but not limited to: allergy to medications, injection, bleeding, stiffness of joint, recurrence of symptoms, loss of function, swelling, drainage, irrigation, need for surgery and pseudo-septic inflammation, were explained to the patient. Also, discussed was the potential for further injections, irrigation and debridement and surgery. Alternate means of treatment have also been discussed with the patient. Administrations This Visit     lidocaine 1 % injection 4 mL     Admin Date  06/16/2021  16:28 Action  Given Dose  4 mL Route  Intra-articular Site  Knee Right Administered By  Yang Lanier LPN    Ordering Provider: PIERRE Urbano    [address]: 1813-3338-73    Lot#: [id number]    : YASIR    Patient Supplied?: No          triamcinolone acetonide (KENALOG-40) injection 40 mg     Admin Date  06/16/2021  16:29 Action  Given

## 2021-10-20 ENCOUNTER — OFFICE VISIT (OUTPATIENT)
Dept: ORTHOPEDIC SURGERY | Age: 61
End: 2021-10-20
Payer: COMMERCIAL

## 2021-10-20 VITALS — WEIGHT: 280 LBS | HEIGHT: 63 IN | BODY MASS INDEX: 49.61 KG/M2 | RESPIRATION RATE: 14 BRPM

## 2021-10-20 DIAGNOSIS — M17.11 PRIMARY OSTEOARTHRITIS OF RIGHT KNEE: Primary | ICD-10-CM

## 2021-10-20 DIAGNOSIS — Z96.652 H/O TOTAL KNEE REPLACEMENT, LEFT: ICD-10-CM

## 2021-10-20 PROCEDURE — 20610 DRAIN/INJ JOINT/BURSA W/O US: CPT | Performed by: PHYSICIAN ASSISTANT

## 2021-10-20 PROCEDURE — 99214 OFFICE O/P EST MOD 30 MIN: CPT | Performed by: PHYSICIAN ASSISTANT

## 2021-10-20 RX ORDER — LIDOCAINE HYDROCHLORIDE 10 MG/ML
4 INJECTION, SOLUTION INFILTRATION; PERINEURAL ONCE
Status: COMPLETED | OUTPATIENT
Start: 2021-10-20 | End: 2021-10-20

## 2021-10-20 RX ORDER — BETAMETHASONE SODIUM PHOSPHATE AND BETAMETHASONE ACETATE 3; 3 MG/ML; MG/ML
12 INJECTION, SUSPENSION INTRA-ARTICULAR; INTRALESIONAL; INTRAMUSCULAR; SOFT TISSUE ONCE
Status: COMPLETED | OUTPATIENT
Start: 2021-10-20 | End: 2021-10-20

## 2021-10-20 RX ADMIN — LIDOCAINE HYDROCHLORIDE 4 ML: 10 INJECTION, SOLUTION INFILTRATION; PERINEURAL at 16:03

## 2021-10-20 RX ADMIN — BETAMETHASONE SODIUM PHOSPHATE AND BETAMETHASONE ACETATE 12 MG: 3; 3 INJECTION, SUSPENSION INTRA-ARTICULAR; INTRALESIONAL; INTRAMUSCULAR; SOFT TISSUE at 16:02

## 2021-10-20 NOTE — PROGRESS NOTES
Number of children: Not on file    Years of education: Not on file    Highest education level: Not on file   Occupational History    Not on file   Tobacco Use    Smoking status: Never Smoker    Smokeless tobacco: Never Used   Vaping Use    Vaping Use: Never used   Substance and Sexual Activity    Alcohol use: Yes     Comment: social but rarely    Drug use: Never    Sexual activity: Not on file   Other Topics Concern    Not on file   Social History Narrative    Not on file     Social Determinants of Health     Financial Resource Strain:     Difficulty of Paying Living Expenses:    Food Insecurity:     Worried About Running Out of Food in the Last Year:     [address] in the Last Year:    Transportation Needs:     Lack of Transportation (Medical):  Lack of Transportation (Non-Medical):    Physical Activity:     Days of Exercise per Week:     Minutes of Exercise per Session:    Stress:     Feeling of Stress :    Social Connections:     Frequency of Communication with Friends and Family:     Frequency of Social Gatherings with Friends and Family:     Attends Yarsani Services:     Active Member of Clubs or Organizations:     Attends Club or Organization Meetings:     Marital Status:    Intimate Partner Violence:     Fear of Current or Ex-Partner:     Emotionally Abused:     Physically Abused:     Sexually Abused:      Past Medical History:   Diagnosis Date    Arthritis     HTN (hypertension)     Sleep apnea     cpap     Past Surgical History:   Procedure Laterality Date    ECTOPIC PREGNANCY SURGERY      TOTAL KNEE ARTHROPLASTY Left 10/20/2020    KNEE TOTAL ARTHROPLASTY performed by Edmond Perkins MD at [address]     No family history on file. Review of Systems   Constitutional: Negative for fever, chills, sweats. Eyes: Negative for changes in vision, or pain. HENT: Negative for ear ache, epistaxis, or sore throat.   Respiratory/Cardio: Negative for Chest pain, palpitations, SOB, or cough. Gastrointestinal: Negative for abdominal pain, N/V/D. Genitourinary: Negative for dysuria, frequency, urgency, or hematuria. Neurological: Negative for headache, numbness, or weakness. Integumentary: Negative for rash, itching, laceration, or abrasion. Musculoskeletal: Positive for Knee Pain (left)       Physical Exam:  Constitutional: Patient is oriented to person, place, and time. Patient appears well-developed and well nourished. HENT: Negative otherwise noted  Head: Normocephalic and Atraumatic  Nose: Normal  Eyes: Conjunctivae and EOM are normal  Neck: Normal range of motion Neck supple. Respiratory/Cardio: Effort normal. No respiratory distress. Musculoskeletal:    right Knee:     Skin: warm and dry, no rash or erythema  Vasculature: 2+ pedal pulses bilaterally  Neuro: Sensation grossly intact to light touch diffusely  Alignment: Genu varum  Tenderness: mild tenderness to medial joint line. No tenderness to quad/patellar tendon, pes anserine bursa or posterior knee. Effusion: None    ROM: (Degrees)       A P       Extension  -5 -5       Flexion   105 115       Crepitation  Yes       Muscle strength:         Flexion   5      Extension  5      SLR   5        Extensor lag   y          Special testing:  y    Pain with deep knee flexion     y    Patellar grind       n    Patellar apprehension      n    Patellar glide         n    Lachman       n    Anterior drawer      n    Pivot shift       n    Posterior drawer      n    Dial test       n    Posterolateral drawer      n    Posterior Sag       n    MCL        n    LCL          mild    Medial joint line tenderness     n    Lateral joint line tenderness     n    Appley's     Left  Knee    Gait:  Antalgic. Incision:  Well healed without any incisional erythema.     Tenderness:  none   Flexion ROM:  115   Extension ROM:  0   Effusion:  no   DVT Evaluation:  No evidence of DVT seen on physical exam. Neurological: Patient is alert and oriented to person, place, and time. Normal strenght. No sensory deficit. Skin: Skin is warm and dry  Psychiatric: Behavior is normal. Thought content normal.  Nursing note and vitals reviewed. Labs and Imaging:       X-rays taken in clinic today and preliminarily reviewed by me:  AP bilateral knees standing lateral view of the left knee taken on 10/20/2021 demonstrates patient is status post left total knee arthroplasty this appears in excellent position no evidence of prosthetic loosening, instability or fracture. Right knee on AP view does demonstrate severe bicompartmental degenerative changes with near bone-on-bone apposition of the medial compartment. Tricompartmental osteophytes present. Orders Placed This Encounter   Procedures    XR KNEE LEFT (1-2 VIEWS)     Standing Status:   Future     Number of Occurrences:   1     Standing Expiration Date:   10/19/2022       Assessment and Plan:  1. H/O total knee replacement, left    2. Primary osteoarthritis of right knee          PLAN:  Michelle Cartagena is a 59 y.o. old female who presents today for annual follow-up for left total knee arthroplasty. She states this left knee is actually doing excellent today she has no pain and she is getting around very well on this left knee. Patient also here to be evaluated for her chronic pain in the right knee history of right knee OA. She has done well with corticosteroid injections in the past and would like a repeat injection. No evidence of instability in this right knee no ligamentous or meniscal pathology at this time no evidence of septic joint. 1.  Celestone injection given in the right knee as outlined below  2. Patient tolerated injection well we will see her back on a as needed basis for this right knee. 3.  Patient's left total knee arthroplasty is 1 year out from surgery is doing very well today.   She has no pain radiographically this looks excellent. Recommend annual follow-up in 1 year for this left knee. Patient is aware that at some point she may need to undergo a right total knee arthroplasty in the future however she states the pain in this right knee is not as severe as it was in the left knee before surgery she would like to hold off on arthroplasty at this time on the right side. Procedure Note: Right knee Celestone Injection   An informed verbal consent for the procedure was obtained and risks including, but not limited to: allergy to medications, injection, bleeding, stiffness of joint, recurrence of symptoms, loss of function, swelling, drainage, irrigation, need for surgery and pseudo-septic inflammation, were explained to the patient. Also, discussed was the potential for further injections, irrigation and debridement and surgery. Alternate means of treatment have also been discussed with the patient. Following an appropriate discussion with the patient regarding the risks and benefits of the procedure she consented to proceed. her right knee was prepped using betadine solution and alcohol swab. Using aseptic technique and through a lateral joint line approach, her right knee was injected superficially with 4 cc of 1% lidocaine without epinephrine and subsequently with 2 cc of 6 mg/mL Celestone into the right kne. A band aid was applied to the injection site. she tolerated the injection with no immediate adverse reactions. Electronically signed by PIERRE Brar on 10/20/21 at 3:56 PM EDT        Please note that this chart was generated using voice recognition Dragon dictation software. Although every effort was made to ensure the accuracy of this automated transcription, some errors in transcription may have occurred.

## 2022-02-23 ENCOUNTER — OFFICE VISIT (OUTPATIENT)
Dept: ORTHOPEDIC SURGERY | Age: 62
End: 2022-02-23
Payer: COMMERCIAL

## 2022-02-23 DIAGNOSIS — Z96.652 H/O TOTAL KNEE REPLACEMENT, LEFT: ICD-10-CM

## 2022-02-23 DIAGNOSIS — M17.11 PRIMARY OSTEOARTHRITIS OF RIGHT KNEE: Primary | ICD-10-CM

## 2022-02-23 PROCEDURE — 99214 OFFICE O/P EST MOD 30 MIN: CPT | Performed by: PHYSICIAN ASSISTANT

## 2022-02-23 NOTE — PROGRESS NOTES
[facility], [address] [place], [address] [place] [address], [address] [facility]           Dept Phone: [phone]           Dept Fax:  [phone] [facility]           Bashir Naqvi          Dept Phone: [phone]           Dept Fax:  [phone]      Chief Compliant:  Chief Complaint   Patient presents with    Follow-up        History of Present Illness: This is a 59 y.o. female who presents to the clinic today for evaluation of had no chief complaint listed for this encounter. Mr. Thomas Quinonez is a 57-year-old female presents for evaluation of chronic right knee pain. Patient with history of end-stage DJD right knee. Also history of left total knee arthroplasty performed by Dr. Deonna Martinez on 10/20/2020 which is doing excellent today. At patient's annual follow-up for her left knee she was complaining of right knee pain and actually underwent a corticosteroid injection. She reports that this injection did give significant relief of pain for about 2-1/2 to 3 months but over the last month her pain has returned. At this time patient is here to discuss treatment options as she is leaning towards total knee arthroplasty on this right side but unsure if now is the right time. She denies any recent knee joint warmth, redness, fever or chills.        Past History:    Current Outpatient Medications:     Aspirin Buf,IsQaf-BsSld-YcWxt, (BUFFERED ASPIRIN) 325 MG TABS, Take 1 tablet by mouth 2 times daily, Disp: 30 tablet, Rfl: 3    losartan (COZAAR) 50 MG tablet, Take 50 mg by mouth daily, Disp: , Rfl:     hydrochlorothiazide (MICROZIDE) 12.5 MG capsule, Take 12.5 mg by mouth daily, Disp: , Rfl:     atenolol (TENORMIN) 50 MG tablet, Take 50 mg by mouth daily, Disp: , Rfl:   Allergies   Allergen Reactions    Lisinopril Other (See Comments)     Cough      Social History Socioeconomic History    Marital status:      Spouse name: Not on file    Number of children: Not on file    Years of education: Not on file    Highest education level: Not on file   Occupational History    Not on file   Tobacco Use    Smoking status: Never Smoker    Smokeless tobacco: Never Used   Vaping Use    Vaping Use: Never used   Substance and Sexual Activity    Alcohol use: Yes     Comment: social but rarely    Drug use: Never    Sexual activity: Not on file   Other Topics Concern    Not on file   Social History Narrative    Not on file     Social Determinants of Health     Financial Resource Strain:     Difficulty of Paying Living Expenses: Not on file   Food Insecurity:     Worried About Running Out of Food in the Last Year: Not on file    Dyan of Food in the Last Year: Not on file   Transportation Needs:     Lack of Transportation (Medical): Not on file    Lack of Transportation (Non-Medical):  Not on file   Physical Activity:     Days of Exercise per Week: Not on file    Minutes of Exercise per Session: Not on file   Stress:     Feeling of Stress : Not on file   Social Connections:     Frequency of Communication with Friends and Family: Not on file    Frequency of Social Gatherings with Friends and Family: Not on file    Attends Congregational Services: Not on file    Active Member of [address] [place] [address] [employer] or Organizations: Not on file    Attends Club or Organization Meetings: Not on file    Marital Status: Not on file   Intimate Partner Violence:     Fear of Current or Ex-Partner: Not on file    Emotionally Abused: Not on file    Physically Abused: Not on file    Sexually Abused: Not on file   Housing Stability:     Unable to Pay for Housing in the Last Year: Not on file    Number of [place] in the Last Year: Not on file    Unstable Housing in the Last Year: Not on file     Past Medical History:   Diagnosis Date    Arthritis     HTN (hypertension)     Sleep apnea cpap     Past Surgical History:   Procedure Laterality Date    ECTOPIC PREGNANCY SURGERY      TOTAL KNEE ARTHROPLASTY Left 10/20/2020    KNEE TOTAL ARTHROPLASTY performed by Carla Ann MD at [address]     No family history on file. Review of Systems   Constitutional: Negative for fever, chills, sweats. Eyes: Negative for changes in vision, or pain. HENT: Negative for ear ache, epistaxis, or sore throat. Respiratory/Cardio: Negative for Chest pain, palpitations, SOB, or cough. Gastrointestinal: Negative for abdominal pain, N/V/D. Genitourinary: Negative for dysuria, frequency, urgency, or hematuria. Neurological: Negative for headache, numbness, or weakness. Integumentary: Negative for rash, itching, laceration, or abrasion. Musculoskeletal: Positive for No chief complaint on file. Physical Exam:  Constitutional: Patient is oriented to person, place, and time. Patient appears well-developed and well nourished. HENT: Negative otherwise noted  Head: Normocephalic and Atraumatic  Nose: Normal  Eyes: Conjunctivae and EOM are normal  Neck: Normal range of motion Neck supple. Respiratory/Cardio: Effort normal. No respiratory distress. Musculoskeletal:    right Knee:     Skin: warm and dry, no rash or erythema  Vasculature: 2+ pedal pulses bilaterally  Neuro: Sensation grossly intact to light touch diffusely  Alignment: Genu varum  Tenderness: medial joint line. No tenderness to quad/patellar tendon, pes anserine bursa or posterior knee.   Effusion: small    ROM: (Degrees)       A P       Extension  -5 -5       Flexion   115 120       Crepitation  Yes       Muscle strength:         Flexion   5      Extension  5      SLR   5        Extensor lag   y          Special testing:  y    Pain with deep knee flexion     y    Patellar grind       n    Patellar apprehension      n    Patellar glide         n    Lachman       n    Anterior drawer      n    Pivot shift       n    Posterior drawer      n    Dial test       n    Posterolateral drawer      n    Posterior Sag       n    MCL        n    LCL          y    Medial joint line tenderness     n    Lateral joint line tenderness     n    Appley's         Neurological: Patient is alert and oriented to person, place, and time. Normal strenght. No sensory deficit. Skin: Skin is warm and dry  Psychiatric: Behavior is normal. Thought content normal.  Nursing note and vitals reviewed. Labs and Imaging:     XR KNEE LEFT (1-2 VIEWS)  X-rays taken in clinic today and preliminarily reviewed by me:  AP bilateral knees standing lateral view of the left knee taken on   10/20/2021 demonstrates patient is status post left total knee   arthroplasty this appears in excellent position no evidence of prosthetic   loosening, instability or fracture. Right knee on AP view does   demonstrate severe bicompartmental degenerative changes with near   bone-on-bone apposition of the medial compartment. Tricompartmental   osteophytes present. X-rays taken in clinic today and preliminarily reviewed by me 2/23/22:  AP bilateral knees standing lateral view of the right knee demonstrates end-stage DJD right knee with bone-on-bone apposition in the medial compartment. Moderate patellofemoral degenerative changes with patellar spurring. No orders of the defined types were placed in this encounter. Assessment and Plan:  1. Primary osteoarthritis of right knee    2. H/O total knee replacement, left          PLAN:  Nely Bhandari is a 59 y.o. old female with right knee osteoarthritis. I had a discussion with the patient with regards to the nature and extent of her problem. We also discussed treatment options available to her including non-operative and operative intervention.  To this end we discussed use of NSAIDs, cortisone and viscosupplementation injections, weight loss, activity modification, physical therapy, bracing, and use of assistive walking devices. We also had discussions about total knee arthroplasties. As outlined above she has attempted treatment to include use of corticosteroid injections, last on 10/20/2021. 1.  After lengthy discussion with patient she would like to hold off on any injections at this time as she is educated that this injection would delay surgical intervention by 3 months. 2.  Patient is unsure if she would like the surgery within that 3-month timeframe however she states she is going through some changes at work would like to see how the knee feels upon these changes and if she is ready for total knee arthroplasty she will contact our office we will schedule her with Dr. Esau Gorman to discuss right total knee arthroplasty however she would like the injection I be happy to see her back this injection can be done per her request.  3.  We will see her back on as needed basis. Electronically signed by PIERRE Salcido on 2/23/22 at 3:40 PM EST        Please note that this chart was generated using voice recognition Dragon dictation software. Although every effort was made to ensure the accuracy of this automated transcription, some errors in transcription may have occurred.

## 2022-05-10 ENCOUNTER — TELEPHONE (OUTPATIENT)
Dept: ORTHOPEDIC SURGERY | Age: 62
End: 2022-05-10

## 2022-05-10 NOTE — TELEPHONE ENCOUNTER
Patient called in to request a renewal on her handicap placard. Patient states her R knee is now acting up please advise and address thank you!

## 2022-09-21 ENCOUNTER — OFFICE VISIT (OUTPATIENT)
Dept: ORTHOPEDIC SURGERY | Age: 62
End: 2022-09-21
Payer: COMMERCIAL

## 2022-09-21 DIAGNOSIS — M17.11 PRIMARY OSTEOARTHRITIS OF RIGHT KNEE: Primary | ICD-10-CM

## 2022-09-21 DIAGNOSIS — Z96.652 H/O TOTAL KNEE REPLACEMENT, LEFT: ICD-10-CM

## 2022-09-21 PROCEDURE — 99213 OFFICE O/P EST LOW 20 MIN: CPT | Performed by: ORTHOPAEDIC SURGERY

## 2022-09-21 NOTE — PROGRESS NOTES
Antione Mendez M.D.            [address] [facility] [address] [address], [address] [facility]           Dept Phone: [phone]           Dept Fax:  [phone]           Bashir Naqvi          Dept Phone: [phone]           Dept Fax:  [phone]      Chief Compliant:  Chief Complaint   Patient presents with    Pain     Rt knee        History of Present Illness: This is a 57 y.o. female who presents to the clinic today for evaluation / follow up of severe right knee pain. Patient is a 57 female who has had severe right knee pain for quite some time. She has a history of a left total knee replacement done by me back in October 2020 and she has no complaints whatsoever with this at all. Patient has had injections to the right knee although not recently and she is at the point where she wished to pursue total knee arthroplasty as her activities become severely restricted    It is noted the patient has a BMI of 49 however she did extremely well with her last one and she has no major medical comorbidities including nondiabetic. Kimberly Zarate Review of Systems   Constitutional: Negative for fever, chills, sweats. Eyes: Negative for changes in vision, or pain. HENT: Negative for ear ache, epistaxis, or sore throat. Respiratory/Cardio: Negative for Chest pain, palpitations, SOB, or cough. Gastrointestinal: Negative for abdominal pain, N/V/D. Genitourinary: Negative for dysuria, frequency, urgency, or hematuria. Neurological: Negative for headache, numbness, or weakness. Integumentary: Negative for rash, itching, laceration, or abrasion. Musculoskeletal: Positive for Pain (Rt knee)       Physical Exam:  Constitutional: Patient is oriented to person, place, and time. Patient appears well-developed and well nourished.    HENT: Negative otherwise noted  Head: Normocephalic and Atraumatic  Nose: Normal  Eyes: Conjunctivae and EOM are normal  Neck: Normal range of motion Neck supple. Respiratory/Cardio: Effort normal. No respiratory distress. Musculoskeletal: Examination of patient's right knee notes she is maintain her motion pretty well 3 to about 110 degrees she has good varus and valgus stability which is good Barrett's negative significant crepitus appreciated throughout moderate to severe patellofemoral pain with compression as well. No hip rotational pain no trochanteric findings. Neurological: Patient is alert and oriented to person, place, and time. Normal strength. No sensory deficit. Skin: Skin is warm and dry  Psychiatric: Behavior is normal. Thought content normal.  Nursing note and vitals reviewed. Labs and Imaging:     XR taken today: No x-rays taken today however x-rays taken on 2/23/2022 show end-stage DJD of the right knee with bone-on-bone apposition entire medial compartment of the right knee as well as moderate to early severe patellofemoral narrowing on the lateral view. Patient is also status post left total knee. No results found. No orders of the defined types were placed in this encounter. Assessment and Plan:  1. Primary osteoarthritis of right knee    2. H/O total knee replacement, left            This is a 57 y.o. female who presents to the clinic today for evaluation / follow up of severe osteoarthritis right knee. History of previous left total knee October 2020.      Past History:    Current Outpatient Medications:     Aspirin Buf,MeYby-YnYec-RyCin, (BUFFERED ASPIRIN) 325 MG TABS, Take 1 tablet by mouth 2 times daily, Disp: 30 tablet, Rfl: 3    losartan (COZAAR) 50 MG tablet, Take 50 mg by mouth daily, Disp: , Rfl:     hydrochlorothiazide (MICROZIDE) 12.5 MG capsule, Take 12.5 mg by mouth daily, Disp: , Rfl:     atenolol (TENORMIN) 50 MG tablet, Take 50 mg by mouth daily, Disp: , Rfl:   Allergies   Allergen Reactions    Lisinopril Other (See Comments)     Cough      Social History     Socioeconomic History    Marital status:      Spouse name: Not on file    Number of children: Not on file    Years of education: Not on file    Highest education level: Not on file   Occupational History    Not on file   Tobacco Use    Smoking status: Never    Smokeless tobacco: Never   Vaping Use    Vaping Use: Never used   Substance and Sexual Activity    Alcohol use: Yes     Comment: social but rarely    Drug use: Never    Sexual activity: Not on file   Other Topics Concern    Not on file   Social History Narrative    Not on file     Social Determinants of Health     Financial Resource Strain: Not on file   Food Insecurity: Not on file   Transportation Needs: Not on file   Physical Activity: Not on file   Stress: Not on file   Social Connections: Not on file   Intimate Partner Violence: Not on file   Housing Stability: Not on file     Past Medical History:   Diagnosis Date    Arthritis     HTN (hypertension)     Sleep apnea     cpap     Past Surgical History:   Procedure Laterality Date    ECTOPIC PREGNANCY SURGERY      TOTAL KNEE ARTHROPLASTY Left 10/20/2020    KNEE TOTAL ARTHROPLASTY performed by Corky Johnson MD at [address] [address] [facility]     No family history on file. Plan  Patient wished to proceed with right total knee arthroplasty she did extremely well with her last 1 in October 2020 that was done on outpatient basis anticipated doing well with this 1 as well she is aware of risk and benefits have been through this before. Preoperative clearance per Dr. Giovana Swift. We will get her scheduled accordingly    Provider Attestation:  Kevan Hough, personally performed the services described in this documentation. All medical record entries made by the scribe were at my direction and in my presence.  I have reviewed the chart and discharge instructions and agree that the records reflect my personal performance and is accurate and complete. Torres Orourke MD. 09/21/22      Please note that this chart was generated using voice recognition Dragon dictation software. Although every effort was made to ensure the accuracy of this automated transcription, some errors in transcription may have occurred.

## 2022-10-11 NOTE — H&P (VIEW-ONLY)
HISTORY and [facility] [address]       NAME:  Dakota Barrera  MRN: [id number]   YOB: 1960   Date: 10/12/2022   Age: 57 y.o. Gender: female     COMPLAINT AND PRESENT HISTORY:   Dakota Barrera is 57 y.o.,  female, presents for pre-anesthesia/admission testing for KNEE TOTAL ARTHROPLASTY per Dr. Daphney Perry   Primary dx: Osteoarthritis of right knee, unspecified osteoarthritis. HPI:  Right knee pain   Patient has history of end-stage DJD right knee, she had left total knee arthroplasty performed by Dr. Daphney Perry on 10/20/2020. Patient C/O of pain swelling, stiffness, in the right Knee. Pt describes the pain as intermittent aching , pain rated 7/10 in intensity at times. Symptoms started over 1 year ago and it getting worse. The knee does not buckle, give way under the patient. No locking up, No recent falls or trauma. Pain aggravated by standing or walking for long time . Pain relieved by sitting, taking Mobic 15 mg daily and applying ice . Pt had multiple steroid injection which gave her significant relief of pain for almost 2-3 months. Last injection was on 10/20/2021 , no PT done before for the right knee. Pt denies fever/chills,chest pain or SOB. RECENT IMAGING R/T HPI     XR KNEE RIGHT (1-2 VIEWS) [[id number]]:    Narrative   X-rays taken in clinic today and preliminarily reviewed by me 2/23/22:   AP bilateral knees standing lateral view of the right knee demonstrates    end-stage DJD right knee with bone-on-bone apposition in the medial    compartment. Moderate patellofemoral degenerative changes with patellar    spurring.      Review of additional significant medical hx:  HTN  Pt denies chest pain , palpitation , dizziness, syncope or headache  She has an appointment with her PCP on 10/18/22 for medical clearance   CURRENT MEDICATION R/T CONDITION : COZAAR, ATENOLOL, and hydrochlorothiazide     BP Readings from Last 3 Encounters:   10/12/22 (!) 152/74   10/20/20 138/79 10/20/20 (!) 107/55       ECG 10/6/2020  Narrative & Impression  Normal sinus rhythm  Moderate voltage criteria for LVH, may be normal variant  Borderline ECG  No previous ECGs available    Sleep apnea on CPAP nightly     Activity level:  Functional Capacity per patient:   1. Patient is  able to walk 2 city blocks on level ground without SOB. 2. Patient is  able to climb 2 flights of stairs without SOB. Denies hx of MRSA infection. Denies hx of blood clots. Denies hx of any personal or family hx of complications w/anesthesia. PAST MEDICAL HISTORY     Past Medical History:   Diagnosis Date    Arthritis     HTN (hypertension)     Sleep apnea     CPAP nightly       SURGICAL HISTORY       Past Surgical History:   Procedure Laterality Date    ECTOPIC PREGNANCY SURGERY      TOTAL KNEE ARTHROPLASTY Left 10/20/2020    KNEE TOTAL ARTHROPLASTY performed by Bernadine Rhoades MD at [place] [address] History     Socioeconomic History    Marital status:      Spouse name: None    Number of children: None    Years of education: None    Highest education level: None   Tobacco Use    Smoking status: Never    Smokeless tobacco: Never   Vaping Use    Vaping Use: Never used   Substance and Sexual Activity    Alcohol use: Yes     Comment: social    Drug use: Never       REVIEW OF SYSTEMS      Allergies   Allergen Reactions    Lisinopril Other (See Comments)     Cough        Current Outpatient Medications on File Prior to Encounter   Medication Sig Dispense Refill    meloxicam (MOBIC) 15 MG tablet Take 15 mg by mouth daily      losartan (COZAAR) 50 MG tablet Take 100 mg by mouth daily      hydrochlorothiazide (MICROZIDE) 12.5 MG capsule Take 25 mg by mouth daily      atenolol (TENORMIN) 50 MG tablet Take 50 mg by mouth daily       No current facility-administered medications on file prior to encounter. Review of Systems   Constitutional:  Positive for activity change. HENT: Negative. Eyes:  Positive for visual disturbance. Eye glasses    Respiratory:  Positive for apnea. Cardiovascular: Negative. Gastrointestinal: Negative. Genitourinary: Negative. Musculoskeletal:  Negative for back pain. Right knee pain    Skin: Negative. Neurological: Negative. Hematological: Negative. Psychiatric/Behavioral: Negative. GENERAL PHYSICAL EXAM     Vitals: BP (!) 152/74 Comment: 163/84  Pulse 70   Temp 99.2 °F (37.3 °C)   Resp 18   Ht 5' 3\" (1.6 m)   Wt 280 lb (127 kg)   SpO2 99%   BMI 49.60 kg/m²               Physical Exam  Constitutional:       General: She is not in acute distress. Appearance: Normal appearance. She is obese. She is not ill-appearing. HENT:      Head: Normocephalic. Right Ear: External ear normal.      Left Ear: External ear normal.      Nose: Nose normal.      Mouth/Throat:      Mouth: Mucous membranes are moist.   Eyes:      General:         Right eye: No discharge. Left eye: No discharge. Cardiovascular:      Rate and Rhythm: Normal rate and regular rhythm. Pulses: Normal pulses. Radial pulses are 2+ on the right side and 2+ on the left side. Dorsalis pedis pulses are 2+ on the right side and 2+ on the left side. Posterior tibial pulses are 2+ on the right side and 2+ on the left side. Heart sounds: Normal heart sounds. Comments: Hypertensive   Pulmonary:      Effort: Pulmonary effort is normal.      Breath sounds: Normal breath sounds. No wheezing or rales. Abdominal:      General: Bowel sounds are normal. There is no distension. Palpations: Abdomen is soft. There is no mass. Tenderness: There is no abdominal tenderness. Musculoskeletal:         General: No swelling. Normal range of motion. Cervical back: Normal range of motion and neck supple. Right lower leg: No edema. Left lower leg: No edema.       Comments: Tenderness on palpation of the Rt Knee joint space worse on the lateral aspect. No limitation of the ROM, skin intact, no erythema or effusion . Skin:     General: Skin is warm and dry. Findings: No bruising, erythema or lesion. Neurological:      General: No focal deficit present. Mental Status: She is alert and oriented to person, place, and time. Motor: No weakness. Gait: Gait normal.   Psychiatric:         Mood and Affect: Mood normal.         Behavior: Behavior normal.       LAB REVIEW     Lab Results   Component Value Date    WBC 5.5 10/12/2022    HGB 14.4 10/12/2022    HCT 44.0 10/12/2022    MCV 90.2 10/12/2022     10/12/2022     Lab Results   Component Value Date/Time     10/12/2022 01:15 PM    K 4.6 10/12/2022 01:15 PM     10/12/2022 01:15 PM    CO2 26 10/12/2022 01:15 PM    BUN 23 10/12/2022 01:15 PM    CREATININE 1.00 10/12/2022 01:15 PM    GLUCOSE 101 10/12/2022 01:15 PM    CALCIUM 9.7 10/12/2022 01:15 PM      Lab Results   Component Value Date/Time    COLORU Yellow 10/12/2022 12:34 PM    NITRU NEGATIVE 10/12/2022 12:34 PM    GLUCOSEU NEGATIVE 10/12/2022 12:34 PM    KETUA NEGATIVE 10/12/2022 12:34 PM    UROBILINOGEN Normal 10/12/2022 12:34 PM    BILIRUBINUR NEGATIVE 10/12/2022 12:34 PM         PRELIMINARY EKG REVIEW, DATE:  10/12/2022     Normal sinus rhythm  Low voltage QRS  Borderline ECG     SURGERY / PROVISIONAL DIAGNOSES:      KNEE TOTAL ARTHROPLASTY    Osteoarthritis of right knee, unspecified osteoarthritis    Patient Active Problem List    Diagnosis Date Noted    Primary osteoarthritis of left knee 10/20/2020           CLEARANCE:   Dr. Aracelis Castillo, anesthesia, was contacted and informed of patient's history and planned surgery. Medical clearance required for scheduled surgery . Dr. Ze Sharp 's office who will be responsible for making sure the clearance is obtained and is in the chart for surgery.     Total time spent on encounter- PAT provider minutes: 21-30 minutes     JOJO Roblero CNP on 10/12/2022 at 1:32 PM

## 2022-10-11 NOTE — H&P
HISTORY and Tre[facility] JAIME [facility] [id number]       NAME:  Zoya Uribe  MRN: [id number]   YOB: 1960   Date: 10/12/2022   Age: 57 y.o. Gender: female     COMPLAINT AND PRESENT HISTORY:   Zoya Uribe is 57 y.o.,  female, presents for pre-anesthesia/admission testing for KNEE TOTAL ARTHROPLASTY per Dr. Nancy Benz   Primary dx: Osteoarthritis of right knee, unspecified osteoarthritis. HPI:  Right knee pain   Patient has history of end-stage DJD right knee, she had left total knee arthroplasty performed by Dr. Nancy Benz on 10/20/2020. Patient C/O of pain swelling, stiffness, in the right Knee. Pt describes the pain as intermittent aching , pain rated 7/10 in intensity at times. Symptoms started over 1 year ago and it getting worse. The knee does not buckle, give way under the patient. No locking up, No recent falls or trauma. Pain aggravated by standing or walking for long time . Pain relieved by sitting, taking Mobic 15 mg daily and applying ice . Pt had multiple steroid injection which gave her significant relief of pain for almost 2-3 months. Last injection was on 10/20/2021 , no PT done before for the right knee. Pt denies fever/chills,chest pain or SOB. RECENT IMAGING R/T HPI     XR KNEE RIGHT (1-2 VIEWS) [[id number]]:    Narrative   X-rays taken in clinic today and preliminarily reviewed by me 2/23/22:   AP bilateral knees standing lateral view of the right knee demonstrates    end-stage DJD right knee with bone-on-bone apposition in the medial    compartment. Moderate patellofemoral degenerative changes with patellar    spurring.      Review of additional significant medical hx:  HTN  Pt denies chest pain , palpitation , dizziness, syncope or headache  She has an appointment with her PCP on 10/18/22 for medical clearance   CURRENT MEDICATION R/T CONDITION : COZAAR, ATENOLOL, and hydrochlorothiazide     BP Readings from Last 3 Encounters:   10/12/22 (!) 152/74   10/20/20 138/79 10/20/20 (!) 107/55       ECG 10/6/2020  Narrative & Impression  Normal sinus rhythm  Moderate voltage criteria for LVH, may be normal variant  Borderline ECG  No previous ECGs available    Sleep apnea on CPAP nightly     Activity level:  Functional Capacity per patient:   1. Patient is  able to walk 2 city blocks on level ground without SOB. 2. Patient is  able to climb 2 flights of stairs without SOB. Denies hx of MRSA infection. Denies hx of blood clots. Denies hx of any personal or family hx of complications w/anesthesia. PAST MEDICAL HISTORY     Past Medical History:   Diagnosis Date    Arthritis     HTN (hypertension)     Sleep apnea     CPAP nightly       SURGICAL HISTORY       Past Surgical History:   Procedure Laterality Date    ECTOPIC PREGNANCY SURGERY      TOTAL KNEE ARTHROPLASTY Left 10/20/2020    KNEE TOTAL ARTHROPLASTY performed by Melani Mcclellan MD at [place] [id number] History     Socioeconomic History    Marital status:      Spouse name: None    Number of children: None    Years of education: None    Highest education level: None   Tobacco Use    Smoking status: Never    Smokeless tobacco: Never   Vaping Use    Vaping Use: Never used   Substance and Sexual Activity    Alcohol use: Yes     Comment: social    Drug use: Never       REVIEW OF SYSTEMS      Allergies   Allergen Reactions    Lisinopril Other (See Comments)     Cough        Current Outpatient Medications on File Prior to Encounter   Medication Sig Dispense Refill    meloxicam (MOBIC) 15 MG tablet Take 15 mg by mouth daily      losartan (COZAAR) 50 MG tablet Take 100 mg by mouth daily      hydrochlorothiazide (MICROZIDE) 12.5 MG capsule Take 25 mg by mouth daily      atenolol (TENORMIN) 50 MG tablet Take 50 mg by mouth daily       No current facility-administered medications on file prior to encounter. Review of Systems   Constitutional:  Positive for activity change. HENT: Negative. Eyes:  Positive for visual disturbance. Eye glasses    Respiratory:  Positive for apnea. Cardiovascular: Negative. Gastrointestinal: Negative. Genitourinary: Negative. Musculoskeletal:  Negative for back pain. Right knee pain    Skin: Negative. Neurological: Negative. Hematological: Negative. Psychiatric/Behavioral: Negative. GENERAL PHYSICAL EXAM     Vitals: BP (!) 152/74 Comment: 163/84  Pulse 70   Temp 99.2 °F (37.3 °C)   Resp 18   Ht 5' 3\" (1.6 m)   Wt 280 lb (127 kg)   SpO2 99%   BMI 49.60 kg/m²               Physical Exam  Constitutional:       General: She is not in acute distress. Appearance: Normal appearance. She is obese. She is not ill-appearing. HENT:      Head: Normocephalic. Right Ear: External ear normal.      Left Ear: External ear normal.      Nose: Nose normal.      Mouth/Throat:      Mouth: Mucous membranes are moist.   Eyes:      General:         Right eye: No discharge. Left eye: No discharge. Cardiovascular:      Rate and Rhythm: Normal rate and regular rhythm. Pulses: Normal pulses. Radial pulses are 2+ on the right side and 2+ on the left side. Dorsalis pedis pulses are 2+ on the right side and 2+ on the left side. Posterior tibial pulses are 2+ on the right side and 2+ on the left side. Heart sounds: Normal heart sounds. Comments: Hypertensive   Pulmonary:      Effort: Pulmonary effort is normal.      Breath sounds: Normal breath sounds. No wheezing or rales. Abdominal:      General: Bowel sounds are normal. There is no distension. Palpations: Abdomen is soft. There is no mass. Tenderness: There is no abdominal tenderness. Musculoskeletal:         General: No swelling. Normal range of motion. Cervical back: Normal range of motion and neck supple. Right lower leg: No edema. Left lower leg: No edema.       Comments: Tenderness on palpation of the Rt Knee joint space worse on the lateral aspect. No limitation of the ROM, skin intact, no erythema or effusion . Skin:     General: Skin is warm and dry. Findings: No bruising, erythema or lesion. Neurological:      General: No focal deficit present. Mental Status: She is alert and oriented to person, place, and time. Motor: No weakness. Gait: Gait normal.   Psychiatric:         Mood and Affect: Mood normal.         Behavior: Behavior normal.       LAB REVIEW     Lab Results   Component Value Date    WBC 5.5 10/12/2022    HGB 14.4 10/12/2022    HCT 44.0 10/12/2022    MCV 90.2 10/12/2022     10/12/2022     Lab Results   Component Value Date/Time     10/12/2022 01:15 PM    K 4.6 10/12/2022 01:15 PM     10/12/2022 01:15 PM    CO2 26 10/12/2022 01:15 PM    BUN 23 10/12/2022 01:15 PM    CREATININE 1.00 10/12/2022 01:15 PM    GLUCOSE 101 10/12/2022 01:15 PM    CALCIUM 9.7 10/12/2022 01:15 PM      Lab Results   Component Value Date/Time    COLORU Yellow 10/12/2022 12:34 PM    NITRU NEGATIVE 10/12/2022 12:34 PM    GLUCOSEU NEGATIVE 10/12/2022 12:34 PM    KETUA NEGATIVE 10/12/2022 12:34 PM    UROBILINOGEN Normal 10/12/2022 12:34 PM    BILIRUBINUR NEGATIVE 10/12/2022 12:34 PM         PRELIMINARY EKG REVIEW, DATE:  10/12/2022     Normal sinus rhythm  Low voltage QRS  Borderline ECG     SURGERY / PROVISIONAL DIAGNOSES:      KNEE TOTAL ARTHROPLASTY    Osteoarthritis of right knee, unspecified osteoarthritis    Patient Active Problem List    Diagnosis Date Noted    Primary osteoarthritis of left knee 10/20/2020           CLEARANCE:   Dr. Ramon Khan, anesthesia, was contacted and informed of patient's history and planned surgery. Medical clearance required for scheduled surgery . Dr. Lori Harding 's office who will be responsible for making sure the clearance is obtained and is in the chart for surgery.     Total time spent on encounter- PAT provider minutes: 21-30 minutes     JOJO Roblero CNP on 10/12/2022 at 1:32 PM

## 2022-10-12 ENCOUNTER — HOSPITAL ENCOUNTER (OUTPATIENT)
Dept: PREADMISSION TESTING | Age: 62
Discharge: HOME | End: 2022-10-16
Attending: ORTHOPAEDIC SURGERY | Admitting: ORTHOPAEDIC SURGERY
Payer: COMMERCIAL

## 2022-10-12 VITALS
HEART RATE: 70 BPM | SYSTOLIC BLOOD PRESSURE: 152 MMHG | TEMPERATURE: 99.2 F | OXYGEN SATURATION: 99 % | WEIGHT: 280 LBS | BODY MASS INDEX: 49.61 KG/M2 | HEIGHT: 63 IN | RESPIRATION RATE: 18 BRPM | DIASTOLIC BLOOD PRESSURE: 74 MMHG

## 2022-10-12 DIAGNOSIS — Z01.818 PREOP EXAMINATION: ICD-10-CM

## 2022-10-12 LAB
ABSOLUTE EOS #: 0.1 K/UL (ref 0–0.4)
ABSOLUTE LYMPH #: 2 K/UL (ref 1–4.8)
ABSOLUTE MONO #: 0.5 K/UL (ref 0.1–1.3)
ANION GAP SERPL CALCULATED.3IONS-SCNC: 11 MMOL/L (ref 9–17)
BASOPHILS # BLD: 0 % (ref 0–2)
BASOPHILS ABSOLUTE: 0 K/UL (ref 0–0.2)
BILIRUBIN URINE: NEGATIVE
BUN BLDV-MCNC: 23 MG/DL (ref 8–23)
CALCIUM SERPL-MCNC: 9.7 MG/DL (ref 8.6–10.4)
CHLORIDE BLD-SCNC: 104 MMOL/L (ref 98–107)
CO2: 26 MMOL/L (ref 20–31)
COLOR: YELLOW
COMMENT UA: NORMAL
CREAT SERPL-MCNC: 1 MG/DL (ref 0.5–0.9)
EOSINOPHILS RELATIVE PERCENT: 2 % (ref 0–4)
GFR SERPL CREATININE-BSD FRML MDRD: >60 ML/MIN/1.73M2
GLUCOSE BLD-MCNC: 101 MG/DL (ref 70–99)
GLUCOSE URINE: NEGATIVE
HCT VFR BLD CALC: 44 % (ref 36–46)
HEMOGLOBIN: 14.4 G/DL (ref 12–16)
KETONES, URINE: NEGATIVE
LEUKOCYTE ESTERASE, URINE: NEGATIVE
LYMPHOCYTES # BLD: 37 % (ref 24–44)
MCH RBC QN AUTO: 29.6 PG (ref 26–34)
MCHC RBC AUTO-ENTMCNC: 32.8 G/DL (ref 31–37)
MCV RBC AUTO: 90.2 FL (ref 80–100)
MONOCYTES # BLD: 9 % (ref 1–7)
NITRITE, URINE: NEGATIVE
PDW BLD-RTO: 14.1 % (ref 11.5–14.9)
PH UA: 7 (ref 5–8)
PLATELET # BLD: 237 K/UL (ref 150–450)
PMV BLD AUTO: 10.2 FL (ref 6–12)
POTASSIUM SERPL-SCNC: 4.6 MMOL/L (ref 3.7–5.3)
PROTEIN UA: NEGATIVE
RBC # BLD: 4.88 M/UL (ref 4–5.2)
SEG NEUTROPHILS: 52 % (ref 36–66)
SEGMENTED NEUTROPHILS ABSOLUTE COUNT: 2.9 K/UL (ref 1.3–9.1)
SODIUM BLD-SCNC: 141 MMOL/L (ref 135–144)
SPECIFIC GRAVITY UA: 1 (ref 1–1.03)
TURBIDITY: CLEAR
URINE HGB: NEGATIVE
UROBILINOGEN, URINE: NORMAL
WBC # BLD: 5.5 K/UL (ref 3.5–11)

## 2022-10-12 PROCEDURE — APPSS30 APP SPLIT SHARED TIME 16-30 MINUTES: Performed by: NURSE PRACTITIONER

## 2022-10-12 PROCEDURE — 36415 COLL VENOUS BLD VENIPUNCTURE: CPT

## 2022-10-12 PROCEDURE — 80048 BASIC METABOLIC PNL TOTAL CA: CPT

## 2022-10-12 PROCEDURE — 93005 ELECTROCARDIOGRAM TRACING: CPT | Performed by: NURSE PRACTITIONER

## 2022-10-12 PROCEDURE — 87641 MR-STAPH DNA AMP PROBE: CPT

## 2022-10-12 PROCEDURE — 85025 COMPLETE CBC W/AUTO DIFF WBC: CPT

## 2022-10-12 PROCEDURE — 81003 URINALYSIS AUTO W/O SCOPE: CPT

## 2022-10-12 RX ORDER — MELOXICAM 15 MG/1
15 TABLET ORAL DAILY
Status: ON HOLD | COMMUNITY
End: 2022-10-25 | Stop reason: HOSPADM

## 2022-10-12 ASSESSMENT — ENCOUNTER SYMPTOMS
BACK PAIN: 0
GASTROINTESTINAL NEGATIVE: 1
APNEA: 1

## 2022-10-12 NOTE — DISCHARGE INSTRUCTIONS
Pre-op Instructions For Out-Patient Surgery    Medication Instructions:  Please stop herbs and any supplements now (includes vitamins and minerals). Please contact your surgeon and prescribing physician for pre-op instructions for any blood thinners. Stop Meloxicam as directed    If you have inhalers/aerosol treatments at home, please use them the morning of your surgery and bring the inhalers with you to the hospital.    Please take the following medications the morning of your surgery with a sip of water:    Losartan, Atenolol    Surgery Instructions:  After midnight before surgery:  Do not eat or drink anything, including water, mints, gum, and hard candy. You may brush your teeth without swallowing. No smoking, chewing tobacco, or street drugs. Please shower or bathe before surgery. If you were given Surgical Scrub Chlorhexidine Gluconate Liquid (CHG), please shower the night before and the morning of your surgery following the detailed instructions you received during your pre-admission visit. Please do not wear any cologne, lotion, powder, deodorant, jewelry, piercings, perfume, makeup, nail polish, hair accessories, or hair spray on the day of surgery. Wear loose comfortable clothing. Leave your valuables at home. Bring a storage case for any glasses/contacts. An adult who is responsible for you MUST drive you home and should be with you for the first 24 hours after surgery. If having out-patient knee and foot surgeries, please arrange for planned crutches, walker, or wheelchair before arriving to the hospital.    The Day of Surgery:  Arrive at [address] [place] [address] Surgery Entrance at the time directed by your surgeon and check in at the desk. If you have a living will or healthcare power of , please bring a copy. You will be taken to the pre-op holding area where you will be prepared for surgery.   A physical assessment will be performed by a nurse practitioner or house officer. Your IV will be started and you will meet your anesthesiologist.    When you go to surgery, your family will be directed to the surgical waiting room, where the doctor should speak with them after your surgery. After surgery, you will be taken to the recovery room then when you are awake and stable you will go to the short stay unit for preparation to be discharged. If you use a Bi-PAP or C-PAP machine, please bring it with you and leave it in the car in case it is needed in recovery room.

## 2022-10-13 LAB
EKG ATRIAL RATE: 72 BPM
EKG P AXIS: 51 DEGREES
EKG P-R INTERVAL: 172 MS
EKG Q-T INTERVAL: 410 MS
EKG QRS DURATION: 102 MS
EKG QTC CALCULATION (BAZETT): 448 MS
EKG R AXIS: -7 DEGREES
EKG T AXIS: 44 DEGREES
EKG VENTRICULAR RATE: 72 BPM
MRSA, DNA, NASAL: ABNORMAL
SPECIMEN DESCRIPTION: ABNORMAL

## 2022-10-13 PROCEDURE — 93010 ELECTROCARDIOGRAM REPORT: CPT | Performed by: INTERNAL MEDICINE

## 2022-10-19 ENCOUNTER — TELEPHONE (OUTPATIENT)
Dept: ADMINISTRATIVE | Age: 62
End: 2022-10-19

## 2022-10-19 NOTE — TELEPHONE ENCOUNTER
Sho from  [facility] faxed paperwork for pt 's loan for notice of disability claim on 10/13/22. Please let Siri Strong know if paperwork is complete and can be returned ASAP.

## 2022-10-24 ENCOUNTER — ANESTHESIA EVENT (OUTPATIENT)
Dept: OPERATING ROOM | Age: 62
End: 2022-10-24
Payer: COMMERCIAL

## 2022-10-25 ENCOUNTER — HOSPITAL ENCOUNTER (OUTPATIENT)
Age: 62
Discharge: HOME HEALTH | End: 2022-10-25
Attending: ORTHOPAEDIC SURGERY | Admitting: ORTHOPAEDIC SURGERY
Payer: COMMERCIAL

## 2022-10-25 ENCOUNTER — ANESTHESIA (OUTPATIENT)
Dept: OPERATING ROOM | Age: 62
End: 2022-10-25
Payer: COMMERCIAL

## 2022-10-25 ENCOUNTER — APPOINTMENT (OUTPATIENT)
Dept: GENERAL RADIOLOGY | Age: 62
End: 2022-10-25
Attending: ORTHOPAEDIC SURGERY
Payer: COMMERCIAL

## 2022-10-25 VITALS
HEIGHT: 63 IN | OXYGEN SATURATION: 95 % | WEIGHT: 280 LBS | RESPIRATION RATE: 18 BRPM | DIASTOLIC BLOOD PRESSURE: 60 MMHG | HEART RATE: 65 BPM | TEMPERATURE: 98.2 F | BODY MASS INDEX: 49.61 KG/M2 | SYSTOLIC BLOOD PRESSURE: 101 MMHG

## 2022-10-25 DIAGNOSIS — M17.12 PRIMARY OSTEOARTHRITIS OF LEFT KNEE: Primary | ICD-10-CM

## 2022-10-25 PROBLEM — M17.11 PRIMARY OSTEOARTHRITIS OF RIGHT KNEE: Status: ACTIVE | Noted: 2022-10-25

## 2022-10-25 PROCEDURE — 97535 SELF CARE MNGMENT TRAINING: CPT

## 2022-10-25 PROCEDURE — 2500000003 HC RX 250 WO HCPCS: Performed by: ORTHOPAEDIC SURGERY

## 2022-10-25 PROCEDURE — 97530 THERAPEUTIC ACTIVITIES: CPT

## 2022-10-25 PROCEDURE — 2500000003 HC RX 250 WO HCPCS: Performed by: ANESTHESIOLOGY

## 2022-10-25 PROCEDURE — C1713 ANCHOR/SCREW BN/BN,TIS/BN: HCPCS | Performed by: ORTHOPAEDIC SURGERY

## 2022-10-25 PROCEDURE — 7100000001 HC PACU RECOVERY - ADDTL 15 MIN: Performed by: ORTHOPAEDIC SURGERY

## 2022-10-25 PROCEDURE — 6360000002 HC RX W HCPCS: Performed by: ORTHOPAEDIC SURGERY

## 2022-10-25 PROCEDURE — 3600000013 HC SURGERY LEVEL 3 ADDTL 15MIN: Performed by: ORTHOPAEDIC SURGERY

## 2022-10-25 PROCEDURE — 2720000010 HC SURG SUPPLY STERILE: Performed by: ORTHOPAEDIC SURGERY

## 2022-10-25 PROCEDURE — 97161 PT EVAL LOW COMPLEX 20 MIN: CPT

## 2022-10-25 PROCEDURE — 2580000003 HC RX 258: Performed by: ANESTHESIOLOGY

## 2022-10-25 PROCEDURE — 3600000003 HC SURGERY LEVEL 3 BASE: Performed by: ORTHOPAEDIC SURGERY

## 2022-10-25 PROCEDURE — 2500000003 HC RX 250 WO HCPCS: Performed by: NURSE ANESTHETIST, CERTIFIED REGISTERED

## 2022-10-25 PROCEDURE — 97116 GAIT TRAINING THERAPY: CPT

## 2022-10-25 PROCEDURE — 76942 ECHO GUIDE FOR BIOPSY: CPT | Performed by: ANESTHESIOLOGY

## 2022-10-25 PROCEDURE — 6360000002 HC RX W HCPCS: Performed by: ANESTHESIOLOGY

## 2022-10-25 PROCEDURE — 2580000003 HC RX 258: Performed by: ORTHOPAEDIC SURGERY

## 2022-10-25 PROCEDURE — 7100000000 HC PACU RECOVERY - FIRST 15 MIN: Performed by: ORTHOPAEDIC SURGERY

## 2022-10-25 PROCEDURE — 3700000000 HC ANESTHESIA ATTENDED CARE: Performed by: ORTHOPAEDIC SURGERY

## 2022-10-25 PROCEDURE — 6360000002 HC RX W HCPCS: Performed by: NURSE ANESTHETIST, CERTIFIED REGISTERED

## 2022-10-25 PROCEDURE — 27447 TOTAL KNEE ARTHROPLASTY: CPT | Performed by: ORTHOPAEDIC SURGERY

## 2022-10-25 PROCEDURE — 3700000001 HC ADD 15 MINUTES (ANESTHESIA): Performed by: ORTHOPAEDIC SURGERY

## 2022-10-25 PROCEDURE — 2709999900 HC NON-CHARGEABLE SUPPLY: Performed by: ORTHOPAEDIC SURGERY

## 2022-10-25 PROCEDURE — 6370000000 HC RX 637 (ALT 250 FOR IP): Performed by: ORTHOPAEDIC SURGERY

## 2022-10-25 PROCEDURE — 73560 X-RAY EXAM OF KNEE 1 OR 2: CPT

## 2022-10-25 PROCEDURE — C1776 JOINT DEVICE (IMPLANTABLE): HCPCS | Performed by: ORTHOPAEDIC SURGERY

## 2022-10-25 PROCEDURE — C9290 INJ, BUPIVACAINE LIPOSOME: HCPCS | Performed by: ANESTHESIOLOGY

## 2022-10-25 PROCEDURE — 97166 OT EVAL MOD COMPLEX 45 MIN: CPT

## 2022-10-25 PROCEDURE — A4216 STERILE WATER/SALINE, 10 ML: HCPCS | Performed by: ORTHOPAEDIC SURGERY

## 2022-10-25 DEVICE — IMPLANTABLE DEVICE: Type: IMPLANTABLE DEVICE | Site: KNEE | Status: FUNCTIONAL

## 2022-10-25 DEVICE — TRAY TIB L71MM KNEE CO CHROM FIN MOD INTLOK VANGUARD: Type: IMPLANTABLE DEVICE | Site: KNEE | Status: FUNCTIONAL

## 2022-10-25 DEVICE — CEMENT BNE 40GM HI VISC RADPQ FOR REV SURG: Type: IMPLANTABLE DEVICE | Site: KNEE | Status: FUNCTIONAL

## 2022-10-25 DEVICE — COMPONENT PAT DIA34MM THK7.8MM THN KNEE POLY 3 PEG SER A: Type: IMPLANTABLE DEVICE | Site: KNEE | Status: FUNCTIONAL

## 2022-10-25 RX ORDER — BUPIVACAINE HYDROCHLORIDE 5 MG/ML
INJECTION, SOLUTION EPIDURAL; INTRACAUDAL
Status: DISCONTINUED | OUTPATIENT
Start: 2022-10-25 | End: 2022-10-25 | Stop reason: SDUPTHER

## 2022-10-25 RX ORDER — LIDOCAINE HYDROCHLORIDE 10 MG/ML
1 INJECTION, SOLUTION EPIDURAL; INFILTRATION; INTRACAUDAL; PERINEURAL
Status: DISCONTINUED | OUTPATIENT
Start: 2022-10-25 | End: 2022-10-25 | Stop reason: HOSPADM

## 2022-10-25 RX ORDER — DEXAMETHASONE SODIUM PHOSPHATE 10 MG/ML
10 INJECTION, SOLUTION INTRAMUSCULAR; INTRAVENOUS ONCE
Status: COMPLETED | OUTPATIENT
Start: 2022-10-25 | End: 2022-10-25

## 2022-10-25 RX ORDER — SODIUM CHLORIDE, SODIUM LACTATE, POTASSIUM CHLORIDE, CALCIUM CHLORIDE 600; 310; 30; 20 MG/100ML; MG/100ML; MG/100ML; MG/100ML
INJECTION, SOLUTION INTRAVENOUS CONTINUOUS
Status: DISCONTINUED | OUTPATIENT
Start: 2022-10-25 | End: 2022-10-25 | Stop reason: HOSPADM

## 2022-10-25 RX ORDER — FENTANYL CITRATE 50 UG/ML
INJECTION, SOLUTION INTRAMUSCULAR; INTRAVENOUS PRN
Status: DISCONTINUED | OUTPATIENT
Start: 2022-10-25 | End: 2022-10-25 | Stop reason: SDUPTHER

## 2022-10-25 RX ORDER — ROCURONIUM BROMIDE 10 MG/ML
INJECTION, SOLUTION INTRAVENOUS PRN
Status: DISCONTINUED | OUTPATIENT
Start: 2022-10-25 | End: 2022-10-25 | Stop reason: SDUPTHER

## 2022-10-25 RX ORDER — SCOLOPAMINE TRANSDERMAL SYSTEM 1 MG/1
1 PATCH, EXTENDED RELEASE TRANSDERMAL ONCE
Status: DISCONTINUED | OUTPATIENT
Start: 2022-10-25 | End: 2022-10-25

## 2022-10-25 RX ORDER — SODIUM CHLORIDE 0.9 % (FLUSH) 0.9 %
5-40 SYRINGE (ML) INJECTION EVERY 12 HOURS SCHEDULED
Status: DISCONTINUED | OUTPATIENT
Start: 2022-10-25 | End: 2022-10-25 | Stop reason: HOSPADM

## 2022-10-25 RX ORDER — CALCIUM CHLORIDE 100 MG/ML
INJECTION INTRAVENOUS; INTRAVENTRICULAR PRN
Status: DISCONTINUED | OUTPATIENT
Start: 2022-10-25 | End: 2022-10-25 | Stop reason: ALTCHOICE

## 2022-10-25 RX ORDER — GABAPENTIN 400 MG/1
800 CAPSULE ORAL ONCE
Status: COMPLETED | OUTPATIENT
Start: 2022-10-25 | End: 2022-10-25

## 2022-10-25 RX ORDER — GLYCOPYRROLATE 0.2 MG/ML
INJECTION INTRAMUSCULAR; INTRAVENOUS PRN
Status: DISCONTINUED | OUTPATIENT
Start: 2022-10-25 | End: 2022-10-25 | Stop reason: SDUPTHER

## 2022-10-25 RX ORDER — LIDOCAINE HYDROCHLORIDE 10 MG/ML
INJECTION, SOLUTION EPIDURAL; INFILTRATION; INTRACAUDAL; PERINEURAL PRN
Status: DISCONTINUED | OUTPATIENT
Start: 2022-10-25 | End: 2022-10-25 | Stop reason: SDUPTHER

## 2022-10-25 RX ORDER — SODIUM CHLORIDE 0.9 % (FLUSH) 0.9 %
5-40 SYRINGE (ML) INJECTION PRN
Status: DISCONTINUED | OUTPATIENT
Start: 2022-10-25 | End: 2022-10-25 | Stop reason: HOSPADM

## 2022-10-25 RX ORDER — OXYCODONE HYDROCHLORIDE AND ACETAMINOPHEN 5; 325 MG/1; MG/1
1-2 TABLET ORAL EVERY 4 HOURS PRN
Qty: 42 TABLET | Refills: 0 | Status: SHIPPED | OUTPATIENT
Start: 2022-10-25 | End: 2022-11-11 | Stop reason: SDUPTHER

## 2022-10-25 RX ORDER — CEFDINIR 300 MG/1
300 CAPSULE ORAL 2 TIMES DAILY
Qty: 20 CAPSULE | Refills: 1 | Status: SHIPPED | OUTPATIENT
Start: 2022-10-25

## 2022-10-25 RX ORDER — ASPIRIN 81 MG/1
81 TABLET ORAL 2 TIMES DAILY
Status: DISCONTINUED | OUTPATIENT
Start: 2022-10-25 | End: 2022-10-25 | Stop reason: HOSPADM

## 2022-10-25 RX ORDER — DIPHENHYDRAMINE HYDROCHLORIDE 50 MG/ML
12.5 INJECTION INTRAMUSCULAR; INTRAVENOUS
Status: DISCONTINUED | OUTPATIENT
Start: 2022-10-25 | End: 2022-10-25 | Stop reason: HOSPADM

## 2022-10-25 RX ORDER — FENTANYL CITRATE 50 UG/ML
25 INJECTION, SOLUTION INTRAMUSCULAR; INTRAVENOUS EVERY 5 MIN PRN
Status: DISCONTINUED | OUTPATIENT
Start: 2022-10-25 | End: 2022-10-25 | Stop reason: HOSPADM

## 2022-10-25 RX ORDER — PROPOFOL 10 MG/ML
INJECTION, EMULSION INTRAVENOUS PRN
Status: DISCONTINUED | OUTPATIENT
Start: 2022-10-25 | End: 2022-10-25 | Stop reason: SDUPTHER

## 2022-10-25 RX ORDER — ONDANSETRON 2 MG/ML
INJECTION INTRAMUSCULAR; INTRAVENOUS PRN
Status: DISCONTINUED | OUTPATIENT
Start: 2022-10-25 | End: 2022-10-25 | Stop reason: SDUPTHER

## 2022-10-25 RX ORDER — OXYCODONE HYDROCHLORIDE 10 MG/1
10 TABLET ORAL EVERY 4 HOURS PRN
Status: DISCONTINUED | OUTPATIENT
Start: 2022-10-25 | End: 2022-10-25 | Stop reason: HOSPADM

## 2022-10-25 RX ORDER — ONDANSETRON 2 MG/ML
4 INJECTION INTRAMUSCULAR; INTRAVENOUS EVERY 6 HOURS PRN
Status: DISCONTINUED | OUTPATIENT
Start: 2022-10-25 | End: 2022-10-25 | Stop reason: HOSPADM

## 2022-10-25 RX ORDER — TRANEXAMIC ACID 100 MG/ML
INJECTION, SOLUTION INTRAVENOUS PRN
Status: DISCONTINUED | OUTPATIENT
Start: 2022-10-25 | End: 2022-10-25 | Stop reason: SDUPTHER

## 2022-10-25 RX ORDER — SODIUM CHLORIDE 9 MG/ML
INJECTION, SOLUTION INTRAVENOUS PRN
Status: DISCONTINUED | OUTPATIENT
Start: 2022-10-25 | End: 2022-10-25 | Stop reason: HOSPADM

## 2022-10-25 RX ORDER — FENTANYL CITRATE 50 UG/ML
50 INJECTION, SOLUTION INTRAMUSCULAR; INTRAVENOUS EVERY 5 MIN PRN
Status: DISCONTINUED | OUTPATIENT
Start: 2022-10-25 | End: 2022-10-25 | Stop reason: HOSPADM

## 2022-10-25 RX ORDER — KETOROLAC TROMETHAMINE 30 MG/ML
30 INJECTION, SOLUTION INTRAMUSCULAR; INTRAVENOUS EVERY 6 HOURS PRN
Status: DISCONTINUED | OUTPATIENT
Start: 2022-10-25 | End: 2022-10-25 | Stop reason: HOSPADM

## 2022-10-25 RX ORDER — ONDANSETRON 2 MG/ML
4 INJECTION INTRAMUSCULAR; INTRAVENOUS
Status: DISCONTINUED | OUTPATIENT
Start: 2022-10-25 | End: 2022-10-25 | Stop reason: HOSPADM

## 2022-10-25 RX ORDER — ACETAMINOPHEN 500 MG
1000 TABLET ORAL ONCE
Status: COMPLETED | OUTPATIENT
Start: 2022-10-25 | End: 2022-10-25

## 2022-10-25 RX ORDER — MIDAZOLAM HYDROCHLORIDE 1 MG/ML
INJECTION INTRAMUSCULAR; INTRAVENOUS PRN
Status: DISCONTINUED | OUTPATIENT
Start: 2022-10-25 | End: 2022-10-25 | Stop reason: SDUPTHER

## 2022-10-25 RX ORDER — ASPIRIN 81 MG/1
81 TABLET ORAL 2 TIMES DAILY
Qty: 90 TABLET | Refills: 1 | Status: SHIPPED | OUTPATIENT
Start: 2022-10-25

## 2022-10-25 RX ORDER — OXYCODONE HYDROCHLORIDE 5 MG/1
5 TABLET ORAL EVERY 4 HOURS PRN
Status: DISCONTINUED | OUTPATIENT
Start: 2022-10-25 | End: 2022-10-25 | Stop reason: HOSPADM

## 2022-10-25 RX ORDER — ACETAMINOPHEN 325 MG/1
650 TABLET ORAL EVERY 6 HOURS
Status: DISCONTINUED | OUTPATIENT
Start: 2022-10-25 | End: 2022-10-25 | Stop reason: HOSPADM

## 2022-10-25 RX ADMIN — SODIUM CHLORIDE, POTASSIUM CHLORIDE, SODIUM LACTATE AND CALCIUM CHLORIDE: 600; 310; 30; 20 INJECTION, SOLUTION INTRAVENOUS at 11:56

## 2022-10-25 RX ADMIN — PROPOFOL 200 MG: 10 INJECTION, EMULSION INTRAVENOUS at 13:21

## 2022-10-25 RX ADMIN — ONDANSETRON 4 MG: 2 INJECTION INTRAMUSCULAR; INTRAVENOUS at 14:25

## 2022-10-25 RX ADMIN — ACETAMINOPHEN 1000 MG: 500 TABLET, FILM COATED ORAL at 11:40

## 2022-10-25 RX ADMIN — VANCOMYCIN HYDROCHLORIDE 1500 MG: 1.5 INJECTION, POWDER, LYOPHILIZED, FOR SOLUTION INTRAVENOUS at 12:10

## 2022-10-25 RX ADMIN — MIDAZOLAM 2 MG: 1 INJECTION INTRAMUSCULAR; INTRAVENOUS at 13:21

## 2022-10-25 RX ADMIN — BUPIVACAINE HYDROCHLORIDE 10 ML: 5 INJECTION, SOLUTION EPIDURAL; INTRACAUDAL at 15:55

## 2022-10-25 RX ADMIN — BUPIVACAINE 10 ML: 13.3 INJECTION, SUSPENSION, LIPOSOMAL INFILTRATION at 15:55

## 2022-10-25 RX ADMIN — FENTANYL CITRATE 50 MCG: 50 INJECTION, SOLUTION INTRAMUSCULAR; INTRAVENOUS at 13:21

## 2022-10-25 RX ADMIN — GABAPENTIN 800 MG: 400 CAPSULE ORAL at 11:40

## 2022-10-25 RX ADMIN — SUGAMMADEX 200 MG: 100 INJECTION, SOLUTION INTRAVENOUS at 14:37

## 2022-10-25 RX ADMIN — SODIUM CHLORIDE, POTASSIUM CHLORIDE, SODIUM LACTATE AND CALCIUM CHLORIDE: 600; 310; 30; 20 INJECTION, SOLUTION INTRAVENOUS at 16:13

## 2022-10-25 RX ADMIN — TRANEXAMIC ACID 1000 MG: 1 INJECTION, SOLUTION INTRAVENOUS at 13:43

## 2022-10-25 RX ADMIN — FENTANYL CITRATE 50 MCG: 50 INJECTION, SOLUTION INTRAMUSCULAR; INTRAVENOUS at 13:51

## 2022-10-25 RX ADMIN — Medication 3000 MG: at 13:29

## 2022-10-25 RX ADMIN — ROCURONIUM BROMIDE 50 MG: 10 INJECTION, SOLUTION INTRAVENOUS at 13:21

## 2022-10-25 RX ADMIN — LIDOCAINE HYDROCHLORIDE 40 MG: 10 INJECTION, SOLUTION EPIDURAL; INFILTRATION; INTRACAUDAL; PERINEURAL at 13:21

## 2022-10-25 RX ADMIN — GLYCOPYRROLATE 0.2 MG: 0.2 INJECTION INTRAMUSCULAR; INTRAVENOUS at 13:54

## 2022-10-25 RX ADMIN — FENTANYL CITRATE 25 MCG: 50 INJECTION INTRAMUSCULAR; INTRAVENOUS at 15:42

## 2022-10-25 RX ADMIN — TRANEXAMIC ACID 1000 MG: 1 INJECTION, SOLUTION INTRAVENOUS at 14:37

## 2022-10-25 RX ADMIN — DEXAMETHASONE SODIUM PHOSPHATE 10 MG: 10 INJECTION INTRAMUSCULAR; INTRAVENOUS at 11:56

## 2022-10-25 RX ADMIN — Medication 3000 MG: at 19:30

## 2022-10-25 ASSESSMENT — PAIN SCALES - GENERAL
PAINLEVEL_OUTOF10: 3
PAINLEVEL_OUTOF10: 0
PAINLEVEL_OUTOF10: 4
PAINLEVEL_OUTOF10: 5

## 2022-10-25 ASSESSMENT — PAIN DESCRIPTION - ORIENTATION
ORIENTATION: RIGHT

## 2022-10-25 ASSESSMENT — PAIN DESCRIPTION - PAIN TYPE
TYPE: SURGICAL PAIN

## 2022-10-25 ASSESSMENT — PAIN DESCRIPTION - LOCATION
LOCATION: KNEE

## 2022-10-25 ASSESSMENT — PAIN - FUNCTIONAL ASSESSMENT
PAIN_FUNCTIONAL_ASSESSMENT: 0-10
PAIN_FUNCTIONAL_ASSESSMENT: PREVENTS OR INTERFERES SOME ACTIVE ACTIVITIES AND ADLS

## 2022-10-25 ASSESSMENT — PAIN DESCRIPTION - DESCRIPTORS
DESCRIPTORS: ACHING;DULL
DESCRIPTORS: ACHING
DESCRIPTORS: ACHING;DULL
DESCRIPTORS: ACHING;DULL

## 2022-10-25 NOTE — INTERVAL H&P NOTE
Update History & Physical    The patient's History and Physical of October 12, 2022 was reviewed with the patient and I examined the patient. There was no change. The surgical site was confirmed by the patient and me. Pt undergoing for KNEE TOTAL ARTHROPLASTY  RIGHT per Dr. Rogelio Price   Pt denies fever/chills, chest pain or SOB  Pt NPO since the past midnight , pt took cozaar and atenolol today with sip of water  Physical exam remains unchanged including cardiac and pulmonary assessment  Denies hx of MRSA infection. Denies hx of blood clots. Denies hx of any personal or family hx of complications w/anesthesia. See nursing flow sheet for vital sings    Lab Results   Component Value Date    WBC 5.5 10/12/2022    HGB 14.4 10/12/2022    HCT 44.0 10/12/2022    MCV 90.2 10/12/2022     10/12/2022     Lab Results   Component Value Date/Time     10/12/2022 01:15 PM    K 4.6 10/12/2022 01:15 PM     10/12/2022 01:15 PM    CO2 26 10/12/2022 01:15 PM    BUN 23 10/12/2022 01:15 PM    CREATININE 1.00 10/12/2022 01:15 PM    GLUCOSE 101 10/12/2022 01:15 PM    CALCIUM 9.7 10/12/2022 01:15 PM      Lab Results   Component Value Date/Time    COLORU Yellow 10/12/2022 12:34 PM    NITRU NEGATIVE 10/12/2022 12:34 PM    GLUCOSEU NEGATIVE 10/12/2022 12:34 PM    KETUA NEGATIVE 10/12/2022 12:34 PM    UROBILINOGEN Normal 10/12/2022 12:34 PM    BILIRUBINUR NEGATIVE 10/12/2022 12:34 PM           Plan: The risks, benefits, expected outcome, and alternative to the recommended procedure have been discussed with the patient. Patient understands and wants to proceed with the procedure.      Electronically signed by JOJO Isaacs CNP on 10/25/2022 at 11:17 AM

## 2022-10-25 NOTE — ANESTHESIA POSTPROCEDURE EVALUATION
Department of Anesthesiology  Postprocedure Note    Patient: Cris Gosselin  MRN: [id number]  YOB: 1960  Date of evaluation: 10/25/2022      Procedure Summary     Date: 10/25/22 Room / Location: [address] [place] [address] [address] / [facility]    Anesthesia Start: 4150 Anesthesia Stop: 0571    Procedure: KNEE TOTAL ARTHROPLASTY (Right: Knee) Diagnosis:       Osteoarthritis of right knee, unspecified osteoarthritis type      (Osteoarthritis of right knee, unspecified osteoarthritis)    Surgeons: Tomas Tejada MD Responsible Provider: Janet Thornton MD    Anesthesia Type: general, regional ASA Status: 3          Anesthesia Type: No value filed.     Behzad Phase I: Behzad Score: 10    Behzad Phase II:        Anesthesia Post Evaluation    Comments: POST- ANESTHESIA EVALUATION       Pt Name: Cris Gosselin  MRN: [id number]  YOB: 1960  Date of evaluation: 10/25/2022  Time:  4:58 PM      /69   Pulse 56   Temp 97.1 °F (36.2 °C)   Resp 14   Ht 5' 3\" (1.6 m)   Wt 280 lb (127 kg)   SpO2 95%   BMI 49.60 kg/m²      Consciousness Level  Awake  Cardiopulmonary Status  Stable  Pain Adequately Treated YES  Nausea / Vomiting  NO  Adequate Hydration  YES  Anesthesia Related Complications NONE      Electronically signed by Janet Thornton MD on 10/25/2022 at 4:58 PM

## 2022-10-25 NOTE — DISCHARGE INSTRUCTIONS
DISCHARGE INSTRUCTIONS  Caring for yourself after joint replacement surgery (Total Hip and Total Knee Replacement)    Activity and Therapy  Receive physical therapy three times per week. (Pain medication one hour prior to therapy)   Perform PT exercises on own when not receiving home or outpatient PT. Ideally exercises should be at least two times a day. Increase level of activity and ambulation each day. Perform deep breathing exercises daily. Patient provides self-care when possible. Work on Range of motion for Total knee patients. No pillow under the knee for Total knee patients. Elevate the surgical leg when seated. Diet:  Increase oral intake of fruits, fiber and water to prevent constipation. Drink fluids frequently and take stool softeners to aid in bowel motility. Increase protein intake/reduce high-sugar intake to help promote healing and prevent infection. Incision Care:  Keep Aquacel or other dressing intact until seen and removed by surgeon, unless saturated, in which case, call surgeon and request instructions. If dressing falls off, call surgeon. [facility] on in the am and off in the pm to reduce swelling. Ice affected area four times a day, for twenty minutes. Pain Medications and Anticoagulant  You have been place on an anticoagulant to prevent blood clots. Take this medication exactly as prescribed. Be alert for signs of bleeding. Take care not to injure yourself. You have been provided pain medicine to control your pain. Do not take more narcotics than prescribed. You may begin weaning from narcotics as your pain level improves by decreasing the amount or frequency of the narcotics. You may also take plain acetaminophen as an alternate to the narcotics. Never exceed the recommended dosage. Ice, rest and elevating the surgical limb also help with pain control.       When to call the Surgeon:  Increased redness, warmth, drainage, swelling or odor from incision site.  Temperature above 101 degrees. Pain not controlled by prescribed medications. Calf tenderness, swelling, or redness. Shortness of breath or chest pain. If you cannot urinate and have been consuming liquids  Any incision or surgical-related concerns. Call surgeon with concerns PRIOR TO going to hospital.    Normal Conditions:  Swelling in the operative leg: this should reduce over time. Bruising behind the knee and around surgical area. Some post-operative pain. Constipation related to pain medications/decreased mobility. (Increase fiber & water intake.)   Slight warmth of operative leg. Fatigue and moderate pain after therapy. Numbness near the incision site. Nausea - take pain medications with food. Cut back on pain medication.     NOTE: Remember to go to follow-up orthopedic appointment with surgeon

## 2022-10-25 NOTE — ANESTHESIA PROCEDURE NOTES
Peripheral Block    Patient location during procedure: post-op  Reason for block: post-op pain management and at surgeon's request  Start time: 10/25/2022 3:55 PM  End time: 10/25/2022 4:05 PM  Staffing  Performed: anesthesiologist   Anesthesiologist: Matt Cerrato MD  Preanesthetic Checklist  Completed: patient identified, IV checked, site marked, risks and benefits discussed, surgical/procedural consents, equipment checked, pre-op evaluation, timeout performed, anesthesia consent given, oxygen available, monitors applied/VS acknowledged, fire risk safety assessment completed and verbalized and blood product R/B/A discussed and consented  Peripheral Block   Patient position: supine  Prep: ChloraPrep  Provider prep: mask and sterile gloves  Patient monitoring: cardiac monitor, continuous pulse ox, frequent blood pressure checks, IV access, oxygen and responsive to questions  Block type: Saphenous  Laterality: right  Injection technique: single-shot  Guidance: ultrasound guided    Needle   Needle type: short-bevel   Needle gauge: 20 G  Needle localization: anatomical landmarks and ultrasound guidance  Needle insertion depth: 5.5 cm  Needle length: 8 cm  Assessment   Injection assessment: negative aspiration for heme, no paresthesia on injection, local visualized surrounding nerve on ultrasound and no intravascular symptoms  Paresthesia pain: none  Slow fractionated injection: yes  Hemodynamics: stable  Real-time US image taken/store: yes  Outcomes: uncomplicated and patient tolerated procedure well    Medications Administered  bupivacaine (PF) 0.5 % - Perineural   10 mL - 10/25/2022 3:55:00 PM  bupivacaine liposome 1.3 % - Perineural   10 mL - 10/25/2022 3:55:00 PM

## 2022-10-25 NOTE — PROGRESS NOTES
[address]    Occupational Therapy Evaluation  Date: 10/25/22  Patient Name: Rom Lamas       Room: 5533/9925-40  MRN: [id number]  Account: [de-identified]   : 1960  (60 y.o.) Gender: female     Discharge Recommendations: The patient may need non-skilled ADL assistance after discharge. Referring Practitioner: Shekhar Swanson MD  Diagnosis: Primary osteoarthritis of R knee Additional Pertinent Hx: Patient has history of end-stage DJD right knee, she had left total knee arthroplasty performed by Dr. Rogelio Price on 10/20/2020. Patient C/O of pain swelling, stiffness, in the right Knee. Pt describes the pain as intermittent aching , pain rated 7/10 in intensity at times. Symptoms started over 1 year ago and it getting worse. The knee does not buckle, give way under the patient. No locking up, No recent falls or trauma. Pain aggravated by standing or walking for long time . Pain relieved by sitting, taking Mobic 15 mg daily and applying ice . Pt had multiple steroid injection which gave her significant relief of pain for almost 2-3 months. Last injection was on 10/20/2021 , no PT done before for the right knee. Pt denies fever/chills,chest pain or SOB. Treatment Diagnosis: Impaired self-care status    Past Medical History:  has a past medical history of Arthritis, HTN (hypertension), and Sleep apnea. Past Surgical History:   has a past surgical history that includes Ectopic pregnancy surgery and Total knee arthroplasty (Left, 10/20/2020). Restrictions  Restrictions/Precautions  Restrictions/Precautions: Surgical Protocols; Fall Risk;General Precautions; Up as Tolerated  Required Braces or Orthoses?: No  Implants present? : Metal implants (L TKA)  Position Activity Restriction  Other position/activity restrictions: OT/PT eval and treat      Vitals  Vitals  Heart Rate: 56  Heart Rate Source: Monitor  BP: 118/69  Patient Position: Supine  MAP (Calculated): 85.33  Resp: 14  SpO2: 95 %  O2 Device: None (Room air)     Subjective  Comments: Okay for OT/PT per RN Tasia Claude.  Pt pleasant and agreeable for therapy  Subjective  Pain: 4/10 pain in R knee with ambulation      Social/Functional History  Social/Functional History  Lives With: Significant other  Type of Home: House  Home Layout: Able to Live on Main level with bedroom/bathroom, Two level  Home Access: Stairs to enter with rails  Entrance Stairs - Number of Steps: 4  Entrance Stairs - Rails: Both  Bathroom Shower/Tub: Tub/Shower unit  Bathroom Toilet: Handicap height  Bathroom Equipment: Grab bars in shower  Bathroom Accessibility: Walker accessible  Home Equipment: Rosea [employer], rolling  Has the patient had two or more falls in the past year or any fall with injury in the past year?: No  Receives Help From: Family  ADL Assistance: [address]: Independent  Homemaking Responsibilities: Yes  Ambulation Assistance: Independent  Transfer Assistance: Independent  Active : Yes  Mode of Transportation: Car, Truck  Occupation: Full time employment  Type of Occupation:  at [employer]  IADL Comments: flat bed  Additional Comments: SO is retired and will be home to assist as needed      Objective  Cognition  Orientation  Overall Orientation Status: Within Normal Limits  Orientation Level: Oriented X4  Cognition  Overall Cognitive Status: WNL   Sensation  Overall Sensation Status: Impaired (Pt reports intermittent N/T in bilat feet)    Activities of Daily Living  ADL  Feeding: Setup  Grooming: Setup  UE Bathing: Stand by assistance  LE Bathing: Contact guard assistance  UE Dressing: Stand by assistance  UE Dressing Skilled Clinical Factors: Seated EOB, pt donned bra and [place] sweatshirt  LE Dressing: Contact guard assistance  LE Dressing Skilled Clinical Factors: Pt able to thread BLE into underwear/pants seated EOB, CGA in stance to manage clothing up over hips with 1 UE support on RW  Toileting: Contact guard assistance  Additional Comments: ADL scores are based on skilled observation and clinical reasoning unless otherwise noted. Pt limited by balance, strength, and activity tolerance/endurance which impacts the pt's ability to safely and independently complete self-care and mobility    Gross Assessment  AROM: Within functional limits  Strength:  Within functional limits  Coordination: Within functional limits  Tone: Normal  Sensation: Impaired (Pt reports intermittent N/T in bilateral feet)    UE Function  LUE AROM (degrees)  LUE AROM : WFL  Left Hand AROM (degrees)  Left Hand AROM: WFL  Tone LUE  LUE Tone: Normotonic  LUE Strength  Gross LUE Strength: WFL  L Hand General: 5/5  LUE Strength Comment: Grossly 5/5    RUE AROM (degrees)  RUE AROM : WFL  Right Hand AROM (degrees)  Right Hand AROM: WFL  Tone RUE  RUE Tone: Normotonic  RUE Strength  Gross RUE Strength: WFL  R Hand General: 5/5  RUE Strength Comment: Grossly 5/5    Fine Motor Skills/Coordination  Coordination  Movements Are Fluid And Coordinated: Yes     Mobility  Bed Mobility  Bed mobility  Supine to Sit: Stand by assistance  Sit to Supine:  (pt left up in chair)  Scooting: Stand by assistance  Bed Mobility Comments: HOB lowered, use of hand rails    Balance  Balance  Sitting Balance: Stand by assistance  Standing Balance: Contact guard assistance  Standing Balance  Time: ~7 minutes  Activity: Functional transfers, mobility  Comment: CGA for safety with initial stands, VC for hand placement with good carryover    Transfers  Transfers  Sit to stand: Contact guard assistance  Stand to sit: Contact guard assistance  Transfer Comments: CGA for safety, VC for hand placement with good carryover    Functional Mobility  Functional - Mobility Device: Rolling Walker  Activity: To/From therapy gym  Assist Level: Contact guard assistance  Functional Mobility Comments: CGA for safety, no LOB noted    Assessment  Assessment  Performance deficits / Impairments: Decreased ADL status, Decreased functional mobility , Decreased ROM, Decreased strength, Decreased endurance, Decreased balance, Decreased sensation, Decreased high-level IADLs  Treatment Diagnosis: Impaired self-care status  Prognosis: Good  Decision Making: Medium Complexity  Discharge Recommendations: Patient would benefit from continued therapy after discharge    Activity Tolerance  Activity Tolerance: Patient Tolerated treatment well    Safety Devices  Type of Devices: Left in chair, Call light within reach, Nurse notified    Patient Education  Patient Education  Education Given To: Patient  Education Provided: Role of Therapy, Plan of Care, Home Exercise Program, Precautions, ADL Adaptive Strategies, Transfer Training  Education Method: Demonstration, Verbal  Barriers to Learning: None  Education Outcome: Verbalized understanding, Demonstrated understanding      Functional Outcome Measures  -PAC Daily Activity Inpatient   How much help for putting on and taking off regular lower body clothing?: A Little  How much help for Bathing?: A Little  How much help for Toileting?: A Little  How much help for putting on and taking off regular upper body clothing?: A Little  How much help for taking care of personal grooming?: A Little  How much help for eating meals?: A Little  [facility] Inpatient Daily Activity Raw Score: 18  AM-PAC Inpatient ADL T-Scale Score : 38.66  ADL Inpatient CMS 0-100% Score: 46.65  ADL Inpatient CMS G-Code Modifier : CK       Goals     Short Term Goals  Time Frame for Short Term Goals: By discharge  Short Term Goal 1: Pt will perform BADLs with mod I and Good safety  Short Term Goal 2: Pt will verbalize/demonstrate at least two AE/DME/adaptive strategies to increase IND with self-care, 100% of the time  Short Term Goal 3: Pt will perform transfers/functional mobility mod I, using least restrictive device, during self-care tasks  Short Term Goal 4: Pt will tolerate standing 10+ minutes, UE support as needed, during functional activity of choice  Short Term Goal 5: Pt will V/D at least two fall prevention/home safety techniques that are applicable to her daily routine  Short Term Goal 6: Pt will actively participate in 15+ minutes of therapeutic exercise/functional activity to promote safety and independence with self-care and mobility    Plan  Occupational Therapy Plan  Times Per Week: 5-7 (1-2x/day)  Current Treatment Recommendations: Strengthening, Balance training, ROM, Functional mobility training, Endurance training, Pain management, Safety education & training, Patient/Caregiver education & training, Equipment evaluation, education, & procurement, Positioning, Return to work related activity, Self-Care / ADL, Home management training      OT Individual Minutes  OT Individual Minutes  Time In: 0203  Time Out: 3050  Minutes: 38  Time Code Minutes   Timed Code Treatment Minutes: 25 Minutes      Electronically signed by LINH Dawson on 10/25/22 at 6:10 PM EDT

## 2022-10-25 NOTE — ANESTHESIA PRE PROCEDURE
Department of Anesthesiology  Preprocedure Note       Name:  Helena Ahn   Age:  57 y.o.  :  1960                                          MRN:  [id number]         Date:  10/25/2022      Surgeon: Raegan Ureña):  Melani Mcclellan MD    Procedure: Procedure(s):  KNEE TOTAL ARTHROPLASTY    Medications prior to admission:   Prior to Admission medications    Medication Sig Start Date End Date Taking? Authorizing Provider   aspirin EC 81 MG EC tablet Take 1 tablet by mouth in the morning and 1 tablet in the evening. 10/25/22  Yes Melani Mcclellan MD   oxyCODONE-acetaminophen (PERCOCET) 5-325 MG per tablet Take 1-2 tablets by mouth every 4 hours as needed for Pain for up to 7 days.  10/25/22 11/1/22 Yes Melani Mcclellan MD   cefdinir (OMNICEF) 300 MG capsule Take 1 capsule by mouth 2 times daily 10/25/22  Yes Melani Mcclellan MD   losartan (COZAAR) 50 MG tablet Take 100 mg by mouth daily    Historical Provider, MD   hydrochlorothiazide (MICROZIDE) 12.5 MG capsule Take 25 mg by mouth daily    Historical Provider, MD   atenolol (TENORMIN) 50 MG tablet Take 50 mg by mouth daily    Historical Provider, MD       Current medications:    Current Facility-Administered Medications   Medication Dose Route Frequency Provider Last Rate Last Admin    sodium chloride flush 0.9 % injection 5-40 mL  5-40 mL IntraVENous 2 times per day Melani Mcclellan MD        sodium chloride flush 0.9 % injection 5-40 mL  5-40 mL IntraVENous PRN Melani Mcclellan MD        0.9 % sodium chloride infusion   IntraVENous PRN Melani Mcclellan MD        ceFAZolin (ANCEF) 3000 mg in dextrose 5 % 100 mL IVPB  3,000 mg IntraVENous On Call to [address], MD        dexamethasone (PF) (DECADRON) injection 10 mg  10 mg IntraVENous Once Melani Mcclellan MD        scopolamine (TRANSDERM-SCOP) transdermal patch 1 patch  1 patch TransDERmal Once Melani Mcclellan MD   1 patch at 10/25/22 1140    lactated ringers infusion   IntraVENous Continuous Vitaly H Migdalia Tillman MD        lidocaine PF 1 % injection 1 mL  1 mL IntraDERmal Once PRN Zee Rutherford MD        vancomycin (VANCOCIN) 1,500 mg in dextrose 5 % 250 mL IVPB (ADDAVIAL)  1,500 mg IntraVENous Once Shlomo Valdez MD           Allergies: Allergies   Allergen Reactions    Ace Inhibitors     Lisinopril Other (See Comments)     Cough        Problem List:    Patient Active Problem List   Diagnosis Code    Primary osteoarthritis of left knee M17.12    Preop examination Z01.818       Past Medical History:        Diagnosis Date    Arthritis     HTN (hypertension)     Sleep apnea     CPAP nightly       Past Surgical History:        Procedure Laterality Date    ECTOPIC PREGNANCY SURGERY      TOTAL KNEE ARTHROPLASTY Left 10/20/2020    KNEE TOTAL ARTHROPLASTY performed by Shlomo Valdez MD at [address] [place] [address] History:    Social History     Tobacco Use    Smoking status: Never    Smokeless tobacco: Never   Substance Use Topics    Alcohol use: Yes     Comment: social                                Counseling given: Not Answered      Vital Signs (Current):   Vitals:    10/25/22 1143   BP: (!) 115/54   Pulse: 59   Resp: 18   Temp: 97.5 °F (36.4 °C)   TempSrc: Infrared   SpO2: 95%   Weight: 280 lb (127 kg)   Height: 5' 3\" (1.6 m)                                              BP Readings from Last 3 Encounters:   10/25/22 (!) 115/54   10/12/22 (!) 152/74   10/20/20 138/79       NPO Status: Time of last liquid consumption: 2130                        Time of last solid consumption: 2130                        Date of last liquid consumption: 10/24/22                        Date of last solid food consumption: 10/24/22    BMI:   Wt Readings from Last 3 Encounters:   10/25/22 280 lb (127 kg)   10/12/22 280 lb (127 kg)   10/20/21 280 lb (127 kg)     Body mass index is 49.6 kg/m².     CBC:   Lab Results   Component Value Date/Time    WBC 5.5 10/12/2022 01:15 PM    RBC 4.88 10/12/2022 01:15 PM    HGB 14.4 10/12/2022 01:15 PM    HCT 44.0 10/12/2022 01:15 PM    MCV 90.2 10/12/2022 01:15 PM    RDW 14.1 10/12/2022 01:15 PM     10/12/2022 01:15 PM       CMP:   Lab Results   Component Value Date/Time     10/12/2022 01:15 PM    K 4.6 10/12/2022 01:15 PM     10/12/2022 01:15 PM    CO2 26 10/12/2022 01:15 PM    BUN 23 10/12/2022 01:15 PM    CREATININE 1.00 10/12/2022 01:15 PM    GFRAA >60 10/06/2020 02:42 PM    LABGLOM >60 10/12/2022 01:15 PM    GLUCOSE 101 10/12/2022 01:15 PM    CALCIUM 9.7 10/12/2022 01:15 PM       POC Tests: No results for input(s): POCGLU, POCNA, POCK, POCCL, POCBUN, POCHEMO, POCHCT in the last 72 hours. Coags: No results found for: PROTIME, INR, APTT    HCG (If Applicable): No results found for: PREGTESTUR, PREGSERUM, HCG, HCGQUANT     ABGs: No results found for: PHART, PO2ART, LJS7PIR, CFI3BMJ, BEART, C7ZKBBYC     Type & Screen (If Applicable):  No results found for: LABABO, LABRH    Drug/Infectious Status (If Applicable):  No results found for: HIV, HEPCAB    COVID-19 Screening (If Applicable):   Lab Results   Component Value Date/Time    COVID19 Not Detected 10/16/2020 02:37 AM           Anesthesia Evaluation  Patient summary reviewed and Nursing notes reviewed no history of anesthetic complications:   Airway: Mallampati: II  TM distance: >3 FB   Neck ROM: full  Mouth opening: > = 3 FB   Dental: normal exam         Pulmonary:normal exam  breath sounds clear to auscultation  (+) sleep apnea: on CPAP,                             Cardiovascular:    (+) hypertension:,       ECG reviewed  Rhythm: regular  Rate: normal           Beta Blocker:  Dose within 24 Hrs         Neuro/Psych:   Negative Neuro/Psych ROS              GI/Hepatic/Renal:   (+) morbid obesity          Endo/Other:    (+) : arthritis: OA., .                 Abdominal:             Vascular: negative vascular ROS.          Other Findings:           Anesthesia Plan      general and regional     ASA 3     (Rt Adductor canal nerve block post op PSR)  Induction: intravenous. MIPS: Postoperative opioids intended and Prophylactic antiemetics administered. Anesthetic plan and risks discussed with patient. Plan discussed with CRNA.                     Geena Hunter MD   10/25/2022

## 2022-10-25 NOTE — CARE COORDINATION
Joint Replacement Discharge Planning Note:    Admission Date:  10/25/2022 Cris Gosselin is a 57 y.o.  female    Admitted for : Osteoarthritis of right knee, unspecified osteoarthritis type [M17.11]    Met with:  Patient    PCP:  Lucian Moscoso:   FRAN    Current Residence/ Living Arrangements:  independently at home with domestic partner, Colt Trivedi PTA:  No    Is patient agreeable to [address]: Yes    Is patient agreeable to outpatient physical therapy:  eventually    Freedom of choice provided: Yes         [address] [facility] Agency/Outpatient Therapy chosen: [address] [place] [address]. Referral faxed and notified Amparo Torres from [facility]. She states that as long as insurance goes through, they will be able to accept patient. Notified Amparo Torres pt will be discharged home today. They will plan for start of care tomorrow and will pull [facility] and d/c med list from [address] [place] [address] Rd. Potential [facility] needed: No    Current home DME:  walker    Pharmacy:  [facility] in [place]    Does Patient want to use MEDS to BEDS?([facility] only) Yes    Transportation Provider:  Patient and Family                       Discharge Plan:   Patient intends to discharge to Home    Patient does not need a wheeled walker.      Anticipated discharge date 10/25/22      Readmission Risk              Risk of Unplanned Readmission:  0           Electronically signed by: Carlitos Seals RN on 10/25/2022 at 5:06 PM

## 2022-10-25 NOTE — PROGRESS NOTES
Physical Therapy  Facility/Department: [facility] MED SURG  Physical Therapy Initial Assessment    Name: Leo Lam  : 1960  MRN: [id number]  Date of Service: 10/25/2022    Discharge Recommendations:  Home with assist PRN   PT Equipment Recommendations  Equipment Needed: No      Patient Diagnosis(es): The encounter diagnosis was Primary osteoarthritis of left knee. Past Medical History:  has a past medical history of Arthritis, HTN (hypertension), and Sleep apnea. Past Surgical History:  has a past surgical history that includes Ectopic pregnancy surgery and Total knee arthroplasty (Left, 10/20/2020).     Assessment   Assessment: pt demonstrates functional mobility at this time  Decision Making: Low Complexity  History: R TKA  Exam: WFLs  Clinical Presentation: stable  Requires PT Follow-Up: No  Activity Tolerance  Activity Tolerance: Patient tolerated evaluation without incident     Plan   Physcial Therapy Plan  Additional Comments: no further in-pt PT needed at this time  Safety Devices  Type of Devices: Left in chair, Call light within reach, Nurse notified     Restrictions  Restrictions/Precautions  Restrictions/Precautions: Surgical Protocols, Fall Risk, General Precautions, Up as Tolerated  Required Braces or Orthoses?: No  Implants present? : Metal implants (L TKA)  Position Activity Restriction  Other position/activity restrictions: OT/PT eval and treat     Subjective   Pain: pt reported pain of 4/10 at R knee with ambulation  General  Patient assessed for rehabilitation services?: Yes  Family / Caregiver Present: No  Follows Commands: Within Functional Limits  Subjective  Subjective: pt alert and cooperative         Social/Functional History  Social/Functional History  Lives With: Significant other  Type of Home: House  Home Layout: Able to Live on Main level with bedroom/bathroom, Two level  Home Access: Stairs to enter with rails  Entrance Stairs - Number of Steps: 4  Entrance Stairs - Rails: Both  Bathroom Shower/Tub: Tub/Shower unit  Bathroom Toilet: Handicap height  Bathroom Equipment: Grab bars in shower  Bathroom Accessibility: Walker accessible  Home Equipment: [employer], rolling  Has the patient had two or more falls in the past year or any fall with injury in the past year?: No  Receives Help From: Family  ADL Assistance: [address]: Independent  Homemaking Responsibilities: Yes  Ambulation Assistance: Independent  Transfer Assistance: Independent  Active : Yes  Mode of Transportation: Car, Truck  Occupation: Full time employment  Type of Occupation:  at [employer]  IADL Comments: flat bed  Additional Comments: SO is retired and will be home to assist as needed       Cognition   Orientation  Overall Orientation Status: Within Normal Limits  Cognition  Overall Cognitive Status: WNL     Objective   O2 Device: None (Room air)         AROM RLE (degrees)  RLE AROM: WFL  RLE General AROM: knee 0-80  AROM LLE (degrees)  LLE AROM : WNL  Strength RLE  Strength RLE: WFL  Comment: grossly 3/5  Strength LLE  Strength LLE: WNL           Bed mobility  Supine to Sit: Stand by assistance  Sit to Supine:  (pt left up in chair)  Scooting: Stand by assistance  Transfers  Sit to Stand: Stand by assistance;Contact guard assistance  Stand to Sit: Stand by assistance;Contact guard assistance  Bed to Chair: Stand by assistance (with RW)  Ambulation  Device: Rolling Walker  Assistance: Stand by assistance;Contact guard assistance  Gait Deviations: Slow Jeniffer;Decreased step length  Distance: 110ft  Stairs/Curb  Stairs?: Yes  Stairs  # Steps : 3 (2)  Stairs Height: 4\" (6\")  Rails: Left ascending  Device: Single pt cane  Assistance: Contact guard assistance     Balance  Sitting - Static: Good  Sitting - Dynamic: Good  Standing - Static: Fair;+  Standing - Dynamic: Fair;+        Education  Patient Education  Education Given To: Patient  Education Provided Comments: Reviewed stairs  Education Method: Verbal;Demonstration  Barriers to Learning: None  Education Outcome: Demonstrated understanding      Therapy Time   Individual Concurrent Group Co-treatment   Time In 8100         Time Out 1126         Minutes 38         Timed Code Treatment Minutes: [address], PT

## 2022-10-25 NOTE — PROGRESS NOTES
CLINICAL PHARMACY NOTE: MEDS TO BEDS    Total # of Prescriptions Filled: 3   The following medications were delivered to the patient:  Percocet 5/325  Aspirin Adult low strength   Cefdinir 300mg    Additional Documentation:  Picked up in pharmacy

## 2022-10-25 NOTE — DISCHARGE INSTR - COC
Continuity of Care Form    Patient Name: Dee Torres   :  1960  MRN:  [id number]    Admit date:  (Not on file)  Discharge date:  10/25/22    Code Status Order: Prior   Advance Directives:     Admitting Physician:  Ambreen Small MD  PCP: Yon Delgado    Discharging Nurse: [facility] Unit/Room#: No information available for this encounter. Discharging Unit Phone Number: [phone]    Emergency Contact:   Extended Emergency Contact Information  Primary Emergency Contact:  Tanvir Garcia, [address] [address] [facility] Phone: [phone]  Relation: Domestic Partner    Past Surgical History:  Past Surgical History:   Procedure Laterality Date    ECTOPIC PREGNANCY SURGERY      TOTAL KNEE ARTHROPLASTY Left 10/20/2020    KNEE TOTAL ARTHROPLASTY performed by Ambreen Small MD at [address] [place] [address]       Immunization History: There is no immunization history for the selected administration types on file for this patient. Active Problems:  Patient Active Problem List   Diagnosis Code    Primary osteoarthritis of left knee M17.12    Preop examination Z01.818       Isolation/Infection:   Isolation            No Isolation          Patient Infection Status       Infection Onset Added Last Indicated Last Indicated By Review Planned Expiration Resolved Resolved By    MRSA 10/06/20 10/07/20 10/12/22 MRSA DNA Probe, Nasal        Resolved    COVID-19 (Rule Out) 10/15/20 10/15/20 10/16/20 Covid-19 Ambulatory (Ordered)   10/18/20 Rule-Out Test Resulted            Nurse Assessment:  Last Vital Signs: There were no vitals taken for this visit.     Last documented pain score (0-10 scale):    Last Weight:   Wt Readings from Last 1 Encounters:   10/12/22 280 lb (127 kg)     Mental Status:  oriented and alert    IV Access:  - None    Nursing Mobility/ADLs:  Walking   Independent  Transfer  Assisted  Bathing  Independent  Dressing  Independent  [address]  Independent  Med Delivery   whole    Wound patients. [facility] go on in the a.m. and come off in the p.m. (Hand wash them every two or three days, roll in towel to remove most of moisture, and hang to dry.)    Incision Care: If patient has ACE bandage it may be removed POD #2  Keep incisional dressing intact until seen and removed by surgeon, unless saturated, in which case, call surgeon and request instructions. If dressing falls off, call surgeon. If using the Prevena dressing, with battery pack, place battery pack in waterproof bag during shower. If using Aquacel dressing then dressing is waterproof and patient may shower. If patient has a Prevena remove on POD #5 and replace with Aquacel dressing (patient was provided with dressing in hospital)  Elevated the leg and ice the affected area four times a day, for twenty minutes each time and after every physical therapy session. Normal Conditions - Will improve with provided comfort measures and time:  Some swelling in the operative leg is normal - this should reduce over time. Some post-operative pain is normal.  This will improve with prescribed medication, provided comfort measures and time. Constipation related to pain medications & decreased mobility is a common occurrence. (Increase your fiber & water intake and take a stool softener.)   Slight warmth of operative site is normal and will diminish with time. Fatigue and moderate pain after therapy is normal and will improve with time. Numbness near the incision site is normal and will improve with time. NOTE: Ensure/Remind patient to go to their follow-up appointment with their orthopedic surgeon, which is scheduled. Abnormal Conditions - When to call the Surgeon:  Increasing/excessive redness, warmth or swelling at the incisional site not relieved with ice and elevation. Increasing/excessive pain not well-controlled by prescribed medications.   Drainage or odor from or around the incision site.  (A little blood showing through the bandage is ok, but active leaking, of any color, coming out of the bandage is NOT normal.)  Temperature above 101 degrees. (A mild temp of 99 - 100 is normal - if temp gets to 101 you may use Tylenol once - if it does not improve, you may use a 2nd dose of Tylenol after 5 hours - if temperature is still at or above 101 degrees then call the surgeon.)  Calf tenderness, swelling, or redness or numbness of the foot/lower leg. Shortness of breath or chest pain. Any other incision or surgical-related concerns. CALL SURGEON with concerns PRIOR TO sending patient to hospital.      Patient's personal belongings (please select all that are sent with patient): All belongings returned to patient    RN SIGNATURE:  Electronically signed by Aliza Lewis RN on 10/25/22 at 5:09 PM EDT    CASE MANAGEMENT/SOCIAL WORK SECTION    Inpatient Status Date:     Readmission Risk Assessment Score:  Readmission Risk              Risk of Unplanned Readmission:  0           Discharging to [facility] [address], [address]  P: [phone]   F: [phone]     / signature: Electronically signed by Aliza Lewis RN on 10/25/22 at 9:13 AM EDT    PHYSICIAN SECTION    Prognosis: Good    Condition at Discharge: Stable    Rehab Potential (if transferring to Rehab): Good    Recommended Labs or Other Treatments After Discharge: see above    Physician Certification: I certify the above information and transfer of Loan Serna  is necessary for the continuing treatment of the diagnosis listed and that she requires Home Care for less 30 days.      Update Admission H&P: No change in H&P    PHYSICIAN SIGNATURE:  Electronically signed by Yakov Gonzalez MD on 10/25/22 at 11:44 AM EDT

## 2022-10-25 NOTE — OP NOTE
Operative Note      Patient: Avinash Lam  YOB: 1960  MRN: [id number]    Date of Procedure: 10/25/2022    Pre-Op Diagnosis: Osteoarthritis of right knee, unspecified osteoarthritis    Post-Op Diagnosis: Same       Procedure(s):  KNEE TOTAL ARTHROPLASTY right    Surgeon(s):  Hannah Escobar MD    Assistant:   Resident: DO Alycia Patiño CST    Anesthesia: General    Estimated Blood Loss (mL): Minimal    Complications: None    Specimens:   * No specimens in log *    Implants:  * No implants in log *      Drains: * No LDAs found *    Findings: Severe DJD right knee    Detailed Description of Procedure:     Patient is a 57 y.o. female with a long standing history of right DJD of the knee. Patient has failed all types of conservative treatment included physical therapy, weight-loss counseling, NSAIDS, and injections. The patient has significant restriction of activities of daily living and/or work/job place abilities, and, therefore, has been counseled for TKA. Patient is aware of all the risks and benefits as highlighted in the surgical consent form. The patient was given 3 grams of Ancef 1.5 g of vancomycin due to a positive MRSA nasal swab in the holding area as well as an adductor canal block. The patient was then taken to the operating suite where general anesthesia was administered. A tourniquet was applied to effected leg and then prepped and draped in the usual sterile fashion. Time out was called to verify laterality. The leg was examined   and the tourniquet inflated to 350 mm of Hg. Midline incision was utilized and taken down to the joint capsule where a mid-vastus approach to the knee was performed. After a complete synovectomy, the patella was elevated, calibrated for thickness, and the above sized, patellar triple pegged drills guide positioned medially and then drilled. Atrial patellar button was positioned in order to re-established the original thickness.  This was then replaced with a protective patellar plate. The knee was then flexed and revealed significant  arthrosis. Osteophytes were removed via rongeur. A drill hole was made in the distal femur and the femoral canal was then irrigated and suctioned. A fluted IM deisy was inserted and a distal femoral cut was made at 5 degrees of valgus at the +2 setting. The proximal tibia was then exposed after resection of the ACL and lateral meniscus. An extra-medullary tibial cutting jug was then aligned in reference to the tibia tubercle and ankle mortise and positional with a slight posterior slope. The cut was made with minimal cutting of the lowest depth of the tibial wear and the fragment removed. The distal femur was then approached and femoral sizing guide with 3 degrees of external rotation was placed flush with the surface and drill holes made and femoral size determined. The appropriate size cutting jig was then placed and anterior, posterior, and chamfer cuts made with an oscillating saw. A laminar  was inserted with care to avoid damaging the MCL. Posterior osteophytes were removed and the capsule stripped from the posterior femoral condyles. The capsule was then injected with the orthopedic cocktail at this time. The tibial cut was then assessed with a baseplate and alignment deisy to assure proper cut. Spacer blocks were then inserted and the knee was assessed to determine the amount of soft tissue release and osteophyte removal necessary  to establish symmetric flexion and extension gaps. The tibia was then elevated, and the above sized tibial plate was positioned for proper external rotation with an alignment deisy down the middle-third of the tibial tubercles. This was pinned in place, the proximal tibialis reamed, the fins were then repacked. The appropriate size femur was positioned and various size of the polyethylene trials were inserted.  The knee was assessed for  ROM and patellar tracking. Satisfied with this, this distal femoral logs were drilled and then all the trial components were removed. The knee was irrigated and dried while the cement was prepared. Cement was applied to to both implant and cut surfaces and the implants impacted and the compression with one size larger poly inserted and excess cement removed. The patella was placed and compressed as well. The knee was placed in full extension and then injected with the remainder  of the 100ml of the orthopedic cocktail. The Irrisept lavage was carried out for the 1 minutes. This was then irrigated and a permanent polyethylene was insert was injected with platelet rich/poor plasma and the tourniquet was released at 45 minutes. Hemostasis was excellent. The knee was closed with a #1 Strata-Fix and vastus with a double-layer of O-Vicryl suture. The subcutaneous tissue closed with a 2-0 Monocryl Strata-Fix  and then a Zip-line used for the skin. This was covered with adaptic and Aquacel dressing and dressed with a large 6-inch Ace dressing from toes to mid-thigh. The patient was awakened and taken to PACU in good condition.       Electronically signed by Joan Saeed MD on 10/25/2022 at 2:42 PM

## 2022-10-26 ENCOUNTER — TELEPHONE (OUTPATIENT)
Dept: ADMINISTRATIVE | Age: 62
End: 2022-10-26

## 2022-10-26 ENCOUNTER — TELEPHONE (OUTPATIENT)
Dept: ORTHOPEDIC SURGERY | Age: 62
End: 2022-10-26

## 2022-10-26 NOTE — FLOWSHEET NOTE
Discharge instructions reviewed with the patient and her SO. Patient discharged with meds filled by meds to beds and knee hi anti em stockings. Patient assisted to the car and tolerated well.

## 2022-10-26 NOTE — TELEPHONE ENCOUNTER
Patient told that disability attending physician statement is done and could be faxed but patient request the form be mailed , original scanned and sent to patient via mail.  Address confirmed

## 2022-10-26 NOTE — TELEPHONE ENCOUNTER
Binh Garcia and spoke with Daron Redd and let her know  [facility] [place] response.

## 2022-10-26 NOTE — TELEPHONE ENCOUNTER
Wero Rogel from [address] [place] [address] called asking if Dr. Lyn Tolbert can add skilled nursing to her orders. Please speak to Wero Rogel.

## 2022-11-09 ENCOUNTER — OFFICE VISIT (OUTPATIENT)
Dept: ORTHOPEDIC SURGERY | Age: 62
End: 2022-11-09

## 2022-11-09 DIAGNOSIS — Z96.651 S/P TOTAL KNEE ARTHROPLASTY, RIGHT: Primary | ICD-10-CM

## 2022-11-09 DIAGNOSIS — M17.11 PRIMARY OSTEOARTHRITIS OF RIGHT KNEE: ICD-10-CM

## 2022-11-09 PROCEDURE — 99024 POSTOP FOLLOW-UP VISIT: CPT | Performed by: ORTHOPAEDIC SURGERY

## 2022-11-09 NOTE — PROGRESS NOTES
Natasha Aragon M.D.            [address] [facility], [address] [facility], [address] [facility]           Dept Phone: [phone]           Dept Fax:  [address]           Bash[place] Naqvi          Dept Phone: [phone]           Dept Fax:  [phone]      Chief Compliant:  Chief Complaint   Patient presents with    Post-Op Check     RT TKA 10/25/22        History of Present Illness:  Patient returns today status post right TKA times 2 weeks. Patient has no major complaints     Review of Systems   Constitutional: Negative for fever, chills, sweats, recent injury, recent illness  Neurological: Negative for Headaches, numbness, or weakness. Integumentary: Negative for rash, itching, ecchymosis, or wounds. Musculoskeletal: Positive for Post-Op Check (RT TKA 10/25/22)       Physical Exam:  Constitutional: Patient is oriented to person, place, and time. Patient appears well-developed and well nourished. Musculoskeletal: Normal gait. Motion 0-100 degrees with expected pain with ROM. No Calf tenderness, Negative Bryan's sign. Neurovascular intact. Neurological: Patient is alert and oriented to person, place, and time. Normal strenght. No sensory deficit. Skin: Skin is warm and dry. Incision is healing well without signs of redness or drainage  Nursing note and vitals reviewed. Labs and Imaging:     XR taken today:  XR KNEE RIGHT (1-2 VIEWS)    Result Date: 11/9/2022  X-rays taken today reviewed by me show standing AP both knees and a lateral the right knee. Patient is status post recent right total knee arthroplasty components. Be in good alignment position on AP as well as lateral views. Patellar height is appropriate there is no periprosthetic complications. Patient had a previous left total knee arthroplasty in good position as well.          Orders Placed This Encounter   Procedures    XR KNEE RIGHT (1-2 VIEWS)     Standing Status:   Future     Number of Occurrences:   1     Standing Expiration Date:   11/4/2023    External Referral To Physical Therapy     Referral Priority:   Routine     Referral Type:   Eval and Treat     Referral Reason:   Specialty Services Required     Requested Specialty:   Physical Therapist     Number of Visits Requested:   1       Assessment and Plan:  1. S/P total knee arthroplasty, right    2. Primary osteoarthritis of right knee    3. Previous left total knee arthroplasty    2 weeks status post right TKA        This is a 57 y.o. female who presents to the clinic today status post right TKA. Continue anticoagulation. Transition to outpatient Pt. Restrictions given. RTO 5-6 weeks. Call if any problems/issues prior to that       Provider Attestation:  Jada Thao, personally performed the services described in this documentation. All medical record entries made by the scribe were at my direction and in my presence. I have reviewed the chart and discharge instructions and agree that the records reflect my personal performance and is accurate and complete. Hillary Alonzo MD. 11/09/22        Please note that this chart was generated using voice recognition Dragon dictation software. Although every effort was made to ensure the accuracy of this automated transcription, some errors in transcription may have occurred.

## 2022-11-11 DIAGNOSIS — M17.12 PRIMARY OSTEOARTHRITIS OF LEFT KNEE: ICD-10-CM

## 2022-11-11 PROBLEM — Z01.818 PREOP EXAMINATION: Status: RESOLVED | Noted: 2022-10-12 | Resolved: 2022-11-11

## 2022-11-11 RX ORDER — OXYCODONE HYDROCHLORIDE AND ACETAMINOPHEN 5; 325 MG/1; MG/1
1-2 TABLET ORAL EVERY 4 HOURS PRN
Qty: 42 TABLET | Refills: 0 | Status: SHIPPED | OUTPATIENT
Start: 2022-11-11 | End: 2022-11-18

## 2022-11-11 RX ORDER — OXYCODONE HYDROCHLORIDE AND ACETAMINOPHEN 5; 325 MG/1; MG/1
1-2 TABLET ORAL EVERY 4 HOURS PRN
Qty: 42 TABLET | Refills: 0 | Status: CANCELLED | OUTPATIENT
Start: 2022-11-11 | End: 2022-11-18

## 2022-11-11 NOTE — TELEPHONE ENCOUNTER
Patient is requesting a refill on Oxycodone to be sent to her pharmacy on file. She is also asking if she's able to re-start her Meloxicam (stopped prior to surgery). Please advise. Thank you.

## 2022-12-14 ENCOUNTER — OFFICE VISIT (OUTPATIENT)
Dept: ORTHOPEDIC SURGERY | Age: 62
End: 2022-12-14

## 2022-12-14 DIAGNOSIS — Z96.651 S/P TOTAL KNEE ARTHROPLASTY, RIGHT: Primary | ICD-10-CM

## 2022-12-14 PROCEDURE — 99024 POSTOP FOLLOW-UP VISIT: CPT | Performed by: ORTHOPAEDIC SURGERY

## 2022-12-14 NOTE — PROGRESS NOTES
Vanesa Nielson returns today she status post right total knee on 10/25/2022. She says her knee overall is doing much better she still in physical therapy at [facility] but overall she is very pleased he says the pain is very minimal    Examination of her right knee no she has full extension flexion about 120 degrees has good varus and valgus stability good patellar tracking and good strength and overall doing very well    No new x-rays taken today    Impression  Status post right total knee  Previous left total knee    Plan  Patient's very happy with results continue outpatient therapy.   We will see her back in 2 months return to work status per patient request.  Call if any problems prior to that time

## 2023-01-17 ENCOUNTER — TELEPHONE (OUTPATIENT)
Dept: ORTHOPEDIC SURGERY | Age: 63
End: 2023-01-17

## 2023-01-17 NOTE — TELEPHONE ENCOUNTER
Pt advised that we do not have an CHRIS on file for [place]. She stated that she will stop into the office to  a copy and was given the hours of operation for the office.

## 2023-01-17 NOTE — TELEPHONE ENCOUNTER
Patient called to request return to work note for 1/23/2023 be faxed to [address] [place] [address],[address]. She states Dr. Lyn Richards told her he would leave the return date up to her and she feels ready.

## 2023-01-17 NOTE — LETTER
[address]  [address] [facility] [address]  Phone: [phone]  Fax: [phone]    Selam Carlisle MD        January 17, 2023     Patient: Mana Lam   YOB: 1960   Date of Visit: 1/17/2023       To Whom It May Concern: It is my medical opinion that Nubia Dowell may return to work on 1/23/23. If you have any questions or concerns, please don't hesitate to call.     Sincerely,        Selam Carlisle MD

## 2023-02-20 ENCOUNTER — OFFICE VISIT (OUTPATIENT)
Dept: ORTHOPEDIC SURGERY | Age: 63
End: 2023-02-20
Payer: COMMERCIAL

## 2023-02-20 VITALS — BODY MASS INDEX: 49.61 KG/M2 | HEIGHT: 63 IN | WEIGHT: 280 LBS

## 2023-02-20 DIAGNOSIS — Z96.652 H/O TOTAL KNEE REPLACEMENT, LEFT: ICD-10-CM

## 2023-02-20 DIAGNOSIS — Z96.651 S/P TOTAL KNEE ARTHROPLASTY, RIGHT: Primary | ICD-10-CM

## 2023-02-20 PROCEDURE — 99213 OFFICE O/P EST LOW 20 MIN: CPT | Performed by: PHYSICIAN ASSISTANT

## 2023-02-20 RX ORDER — AMOXICILLIN 500 MG/1
CAPSULE ORAL
Qty: 6 CAPSULE | Refills: 0 | Status: SHIPPED | OUTPATIENT
Start: 2023-02-20

## 2023-02-20 RX ORDER — CELECOXIB 200 MG/1
CAPSULE ORAL
COMMUNITY
Start: 2023-02-11

## 2023-02-20 NOTE — PROGRESS NOTES
[address] [facility], [address] [facility] [address], [address] [facility], [address] [facility]           Dept Phone: [phone]           Dept Fax:  [phone] [address] [facility]           Bashir Naqvi          Dept Phone: [phone]           Dept Fax:  [phone]      Chief Compliant:  Chief Complaint   Patient presents with    Knee Pain     right        History of Present Illness:  Abdirahman James returns today. This is a 57 y.o. female who presents to the clinic today for follow up of right total knee arthroplasty on 10/25/2022. Patient with history of left total knee arthroplasty in 2020. She reports that both knees are doing excellent today she has completed physical therapy for the right knee and has very little pain. She reports her range of motion continues to do well she continues with home exercise program.    Patient reports she actually restarted physical therapy but for a different issue was found to have history of spondylolisthesis of the lumbar spine and is currently in PT for that issue at this time. She states that this is causing radicular symptoms down the right leg but the knee itself is feeling very well. .       Review of Systems   Constitutional: Negative for fever, chills, sweats, recent illness, or recent injury. Neurological: Negative for headaches, numbness, or weakness. Integumentary: Negative for rash, itching, ecchymosis, abrasions, or laceration. Musculoskeletal: Positive for Knee Pain (right)       Physical Exam:  Constitutional: Patient is oriented to person, place, and time. Patient appears well-developed and well nourished. Musculoskeletal:    right Knee    Gait:  Normal.   Incision:  {Well healed without any incisional erythema.     Tenderness:  none   Flexion ROM:  120   Extension ROM:  0   Effusion:  no   DVT Evaluation:  No evidence of DVT seen on physical exam.       Neurological: Patient is alert and oriented to person, place, and time. Normal strenght. No sensory deficit. Skin: Skin is warm and dry  Psychiatric: Behavior is normal. Thought content normal.  Nursing note and vitals reviewed. Labs and Imaging:     XR taken today:  No results found. No new x-rays are taken today however those from 11/9/2022 available for independent review demonstrate AP and lateral views of the right knee status post bilateral total knee arthroplasty components appear in excellent position at that time. Assessment and Plan:  1. S/P total knee arthroplasty, right    2. H/O total knee replacement, left              PLAN:  This is a 57 y.o. female who presents to the clinic today for follow up status post right total knee arthroplasty on 10/25/2022. Patient reports her knee is doing excellent history of left total knee arthroplasty in 2020 was also doing very well. 1.  Patient is doing very well better than expected for 4 months postoperatively. 2. Continue with home exercise program  3. Patient does continue to have right-sided radicular symptoms and was found to have lumbar spondylolisthesis she is currently in physical therapy for this. Continue with this if she continues to be painful would be happy to see patient back in our clinic with myself or Dr. Everett Araujo for further evaluation of the back. 4.  Follow-up in October 2023 for 1 year follow-up of right total knee arthroplasty. Please note that this chart was generated using voice recognition Dragon dictation software. Although every effort was made to ensure the accuracy of this automated transcription, some errors in transcription may have occurred.

## 2023-10-25 ENCOUNTER — OFFICE VISIT (OUTPATIENT)
Dept: ORTHOPEDIC SURGERY | Age: 63
End: 2023-10-25
Payer: COMMERCIAL

## 2023-10-25 VITALS — HEIGHT: 63 IN | BODY MASS INDEX: 49.61 KG/M2 | WEIGHT: 279.98 LBS

## 2023-10-25 DIAGNOSIS — Z96.651 S/P TOTAL KNEE ARTHROPLASTY, RIGHT: Primary | ICD-10-CM

## 2023-10-25 DIAGNOSIS — Z96.652 H/O TOTAL KNEE REPLACEMENT, LEFT: ICD-10-CM

## 2023-10-25 PROCEDURE — 1123F ACP DISCUSS/DSCN MKR DOCD: CPT | Performed by: PHYSICIAN ASSISTANT

## 2023-10-25 PROCEDURE — 99213 OFFICE O/P EST LOW 20 MIN: CPT | Performed by: PHYSICIAN ASSISTANT

## 2023-10-25 NOTE — PROGRESS NOTES
[address] [facility], [address] [facility], [address] [place] [address] [address]           Dept Phone: [phone]           Dept Fax:  [phone] [address]          Dept Phone: [phone]           Dept Fax:  [phone]      Chief Compliant:  Chief Complaint   Patient presents with    Knee Pain     Right        History of Present Illness:  MOYERSCLAUDINE returns today. This is a 70 y.o. female who presents to the clinic today for follow up of bilateral total knee arthroplasty. Patient is here for routine follow-up appointment. Status post October 25 2022 for right total knee arthroplasty in October 2020 for left total knee arthroplasty. .     She reports both knees are doing excellent today she has very little pain in either knees is ambulating well. Of note patient is status post recent multilevel lumbar fusion just on 10/2/2023 and states that this is actually doing quite well considering how recent this was. No complaints at this time in regards to the knees. Review of Systems   Constitutional: Negative for fever, chills, sweats, recent illness, or recent injury. Neurological: Negative for headaches, numbness, or weakness. Integumentary: Negative for rash, itching, ecchymosis, abrasions, or laceration. Musculoskeletal: Positive for Knee Pain (Right)       Physical Exam:  Constitutional: Patient is oriented to person, place, and time. Patient appears well-developed and well nourished. Musculoskeletal:    Left Knee    Gait:  Normal.   Incision:  {Well healed without any incisional erythema. Tenderness:  none   Flexion ROM:  120   Extension ROM:  0   Effusion:  no   DVT Evaluation:  No evidence of DVT seen on physical exam.     Right Knee    Gait:  Normal.   Incision:  {Well healed without any incisional erythema.     Tenderness:

## (undated) DEVICE — SOLUTION IV IRRIG POUR BRL 0.9% SODIUM CHL 2F7124

## (undated) DEVICE — Z DUP USE 2428662 DRESSING POSTOP AG PRISMASEAL 3.5X14IN

## (undated) DEVICE — CEMENT MIXING SYSTEM WITH FEMORAL BREAKWAY NOZZLE: Brand: REVOLUTION

## (undated) DEVICE — SUTURE VCRL SZ 0 L36IN ABSRB UD CT-1 L36MM 1/2 CIR TAPR PNT VCP946H

## (undated) DEVICE — ELECTRODE ES L3IN S STL BLDE INSUL DISP VALLEYLAB EDGE

## (undated) DEVICE — KIT FIX W/ K WIRE GRFT PASS PIN 2 DRL BIT BNE PLUG MRK PEN

## (undated) DEVICE — 2108 SERIES SAGITTAL BLADE FLARED, GROUND  (29.0 X 1.32 X 84.0MM)

## (undated) DEVICE — MERCY HEALTH ST CHARLES: Brand: MEDLINE INDUSTRIES, INC.

## (undated) DEVICE — COOLER THER 20-31IN L CRYO COMB W/ PD KNEE TB GRAV FLO

## (undated) DEVICE — 4-PORT MANIFOLD: Brand: NEPTUNE 2

## (undated) DEVICE — KIT SEP W/ BLD DRAW TB SYR NDL TRNQT PD

## (undated) DEVICE — DRESSING PETRO W3XL8IN OIL EMUL N ADH GZ KNIT IMPREG CELOS

## (undated) DEVICE — KIT AUTOTRNS APPL AERO 2 SET SYR 2 TIP FOR PLT SEP SYS GPS

## (undated) DEVICE — GLOVE SURG SZ 85 L12IN FNGR THK79MIL GRN LTX FREE

## (undated) DEVICE — MASTISOL ADHESIVE LIQ 2/3ML

## (undated) DEVICE — SUTURE FIBERWIRE SZ 5 L38IN NONABSORBABLE BLU L48MM 1/2 AR7211

## (undated) DEVICE — INTENDED TO SUPPORT AND MAINTAIN THE POSITION OF AN ANESTHETIZED PATIENT DURING SURGERY: Brand: HERMANTOR XL PINK KNEE POSITIONING PAD

## (undated) DEVICE — Device

## (undated) DEVICE — BANDAGE COBAN 4 IN COMPR W4INXL5YD FOAM COHESIVE QUIK STK SELF ADH SFT

## (undated) DEVICE — SUTURE MCRYL + SZ 3-0 L27IN ABSRB UD PS1 L24MM 3/8 CIR REV MCP936H

## (undated) DEVICE — SUTURE STRATAFIX SPRL MCRYL + SZ 2 0 L27IN ABSRB UD W NDL SXMP1B419

## (undated) DEVICE — CLOSURE SKIN FLX NONINVASIVE PRELOC TECHNOLOGY FOR 24IN

## (undated) DEVICE — ZIMMER® STERILE DISPOSABLE TOURNIQUET CUFF WITH PLC, DUAL PORT, SINGLE BLADDER, 42 IN. (107 CM)

## (undated) DEVICE — GLOVE ORTHO 8   MSG9480

## (undated) DEVICE — GLOVE ORANGE PI 7   MSG9070

## (undated) DEVICE — 450 ML BOTTLE OF 0.05% CHLORHEXIDINE GLUCONATE IN 99.95% STERILE WATER FOR IRRIGATION, USP AND APPLICATOR.: Brand: IRRISEPT ANTIMICROBIAL WOUND LAVAGE

## (undated) DEVICE — BLANKET WRM W29.9XL79.1IN UP BODY FORC AIR MISTRAL-AIR

## (undated) DEVICE — DRESSING ALGINATE POST OPERATIVE 12X3.5 IN RECT PRIMASEAL

## (undated) DEVICE — SOLUTION IRRIG 3000ML 0.9% SOD CHL USP UROMATIC PLAS CONT

## (undated) DEVICE — DUAL CUT SAGITTAL BLADE

## (undated) DEVICE — SUTURE VCRL + SZ 2-0 L27IN ABSRB UD CP-1 1/2 CIR REV CUT VCP266H

## (undated) DEVICE — GLOVE ORANGE PI 8 1/2   MSG9085

## (undated) DEVICE — SUTURE STRATAFIX SYMMETRIC PDS + SZ 1 L18IN ABSRB VLT L48MM SXPP1A400

## (undated) DEVICE — SOLUTION IV IRRIG WATER 1000ML POUR BRL 2F7114

## (undated) DEVICE — SYSTEM VAC MIX SGL DBL CLEARMIX 1 PER CA

## (undated) DEVICE — Z INACTIVE USE 2660664 SOLUTION IRRIG 3000ML 0.9% SOD CHL USP UROMATIC PLAS CONT

## (undated) DEVICE — HANDPIECE SET WITH BONE CLEANING TIP AND SUCTION TUBE: Brand: INTERPULSE